# Patient Record
Sex: MALE | Race: WHITE | NOT HISPANIC OR LATINO | Employment: UNEMPLOYED | URBAN - METROPOLITAN AREA
[De-identification: names, ages, dates, MRNs, and addresses within clinical notes are randomized per-mention and may not be internally consistent; named-entity substitution may affect disease eponyms.]

---

## 2019-01-03 ENCOUNTER — HISTORICAL (OUTPATIENT)
Dept: RESPIRATORY THERAPY | Facility: HOSPITAL | Age: 59
End: 2019-01-03

## 2019-01-03 LAB
ABS NEUT (OLG): 2.61 X10(3)/MCL (ref 2.1–9.2)
ALBUMIN SERPL-MCNC: 4.1 GM/DL (ref 3.4–5)
ALBUMIN/GLOB SERPL: 1 RATIO (ref 1–2)
ALP SERPL-CCNC: 88 UNIT/L (ref 45–117)
ALT SERPL-CCNC: 27 UNIT/L (ref 12–78)
APPEARANCE, UA: CLEAR
AST SERPL-CCNC: 27 UNIT/L (ref 15–37)
BACTERIA #/AREA URNS AUTO: ABNORMAL /[HPF]
BASOPHILS # BLD AUTO: 0.05 X10(3)/MCL
BASOPHILS NFR BLD AUTO: 1 %
BILIRUB SERPL-MCNC: 0.7 MG/DL (ref 0.2–1)
BILIRUB UR QL STRIP: NEGATIVE
BILIRUBIN DIRECT+TOT PNL SERPL-MCNC: 0.2 MG/DL
BILIRUBIN DIRECT+TOT PNL SERPL-MCNC: 0.5 MG/DL
BUN SERPL-MCNC: 16 MG/DL (ref 7–18)
CALCIUM SERPL-MCNC: 8.8 MG/DL (ref 8.5–10.1)
CHLORIDE SERPL-SCNC: 106 MMOL/L (ref 98–107)
CHOLEST SERPL-MCNC: 142 MG/DL
CHOLEST/HDLC SERPL: 1.4 {RATIO} (ref 0–5)
CO2 SERPL-SCNC: 28 MMOL/L (ref 21–32)
COLOR UR: NORMAL
CREAT SERPL-MCNC: 1.1 MG/DL (ref 0.6–1.3)
EOSINOPHIL # BLD AUTO: 0.11 X10(3)/MCL
EOSINOPHIL NFR BLD AUTO: 2 %
ERYTHROCYTE [DISTWIDTH] IN BLOOD BY AUTOMATED COUNT: 12.9 % (ref 11.5–14.5)
EST. AVERAGE GLUCOSE BLD GHB EST-MCNC: 117 MG/DL
GLOBULIN SER-MCNC: 3.7 GM/ML (ref 2.3–3.5)
GLUCOSE (UA): NORMAL
GLUCOSE SERPL-MCNC: 96 MG/DL (ref 74–106)
HAV IGM SERPL QL IA: NONREACTIVE
HBA1C MFR BLD: 5.7 % (ref 4.2–6.3)
HBV CORE IGM SERPL QL IA: NONREACTIVE
HBV SURFACE AG SERPL QL IA: NEGATIVE
HCT VFR BLD AUTO: 38.6 % (ref 40–51)
HCV AB SERPL QL IA: NONREACTIVE
HDLC SERPL-MCNC: 103 MG/DL
HGB BLD-MCNC: 12.7 GM/DL (ref 13.5–17.5)
HGB UR QL STRIP: NEGATIVE
HIV 1+2 AB+HIV1 P24 AG SERPL QL IA: NONREACTIVE
HYALINE CASTS #/AREA URNS LPF: ABNORMAL /[LPF]
IMM GRANULOCYTES # BLD AUTO: 0.01 10*3/UL
IMM GRANULOCYTES NFR BLD AUTO: 0 %
KETONES UR QL STRIP: NEGATIVE
LDLC SERPL CALC-MCNC: 18 MG/DL (ref 0–130)
LEUKOCYTE ESTERASE UR QL STRIP: NEGATIVE
LYMPHOCYTES # BLD AUTO: 1.6 X10(3)/MCL
LYMPHOCYTES NFR BLD AUTO: 32 % (ref 13–40)
MCH RBC QN AUTO: 30.9 PG (ref 26–34)
MCHC RBC AUTO-ENTMCNC: 32.9 GM/DL (ref 31–37)
MCV RBC AUTO: 93.9 FL (ref 80–100)
MONOCYTES # BLD AUTO: 0.6 X10(3)/MCL
MONOCYTES NFR BLD AUTO: 12 % (ref 4–12)
NEUTROPHILS # BLD AUTO: 2.61 X10(3)/MCL
NEUTROPHILS NFR BLD AUTO: 52 X10(3)/MCL
NITRITE UR QL STRIP: NEGATIVE
PH UR STRIP: 5 [PH] (ref 4.5–8)
PLATELET # BLD AUTO: 180 X10(3)/MCL (ref 130–400)
PMV BLD AUTO: 11.6 FL (ref 7.4–10.4)
POTASSIUM SERPL-SCNC: 3.9 MMOL/L (ref 3.5–5.1)
PROT SERPL-MCNC: 7.8 GM/DL (ref 6.4–8.2)
PROT UR QL STRIP: NEGATIVE
PSA SERPL-MCNC: 1.3 NG/ML
RBC # BLD AUTO: 4.11 X10(6)/MCL (ref 4.5–5.9)
RBC #/AREA URNS AUTO: ABNORMAL /[HPF]
SODIUM SERPL-SCNC: 142 MMOL/L (ref 136–145)
SP GR UR STRIP: 1.02 (ref 1–1.03)
SQUAMOUS #/AREA URNS LPF: ABNORMAL /[LPF]
T PALLIDUM AB SER QL: NONREACTIVE
TRIGL SERPL-MCNC: 106 MG/DL
TSH SERPL-ACNC: 1.93 MIU/L (ref 0.36–3.74)
UROBILINOGEN UR STRIP-ACNC: NORMAL
VLDLC SERPL CALC-MCNC: 21 MG/DL
WBC # SPEC AUTO: 5 X10(3)/MCL (ref 4.5–11)
WBC #/AREA URNS AUTO: ABNORMAL /HPF

## 2019-03-28 ENCOUNTER — HISTORICAL (OUTPATIENT)
Dept: INTERNAL MEDICINE | Facility: CLINIC | Age: 59
End: 2019-03-28

## 2019-03-28 LAB
BUN SERPL-MCNC: 15 MG/DL (ref 7–18)
CALCIUM SERPL-MCNC: 9.3 MG/DL (ref 8.5–10.1)
CHLORIDE SERPL-SCNC: 107 MMOL/L (ref 98–107)
CO2 SERPL-SCNC: 31 MMOL/L (ref 21–32)
CREAT SERPL-MCNC: 1.1 MG/DL (ref 0.6–1.3)
CREAT/UREA NIT SERPL: 14
GLUCOSE SERPL-MCNC: 86 MG/DL (ref 74–106)
POTASSIUM SERPL-SCNC: 4.2 MMOL/L (ref 3.5–5.1)
SODIUM SERPL-SCNC: 140 MMOL/L (ref 136–145)

## 2019-04-18 ENCOUNTER — HISTORICAL (OUTPATIENT)
Dept: RADIOLOGY | Facility: HOSPITAL | Age: 59
End: 2019-04-18

## 2019-05-22 ENCOUNTER — HISTORICAL (OUTPATIENT)
Dept: RADIOLOGY | Facility: HOSPITAL | Age: 59
End: 2019-05-22

## 2019-07-02 ENCOUNTER — HISTORICAL (OUTPATIENT)
Dept: CARDIOLOGY | Facility: HOSPITAL | Age: 59
End: 2019-07-02

## 2019-07-02 LAB
ABS NEUT (OLG): 2.85 X10(3)/MCL (ref 2.1–9.2)
APTT PPP: 27.2 SECOND(S) (ref 23.3–37)
BASOPHILS # BLD AUTO: 0.04 X10(3)/MCL
BASOPHILS NFR BLD AUTO: 1 %
BUN SERPL-MCNC: 15 MG/DL (ref 7–18)
CALCIUM SERPL-MCNC: 9.5 MG/DL (ref 8.5–10.1)
CHLORIDE SERPL-SCNC: 111 MMOL/L (ref 98–107)
CO2 SERPL-SCNC: 29 MMOL/L (ref 21–32)
CREAT SERPL-MCNC: 1 MG/DL (ref 0.6–1.3)
CREAT/UREA NIT SERPL: 15
EOSINOPHIL # BLD AUTO: 0.19 X10(3)/MCL
EOSINOPHIL NFR BLD AUTO: 4 %
ERYTHROCYTE [DISTWIDTH] IN BLOOD BY AUTOMATED COUNT: 13.4 % (ref 11.5–14.5)
GLUCOSE SERPL-MCNC: 92 MG/DL (ref 74–106)
HCT VFR BLD AUTO: 41 % (ref 40–51)
HGB BLD-MCNC: 13.1 GM/DL (ref 13.5–17.5)
INR PPP: 1.03 (ref 0.9–1.2)
LYMPHOCYTES # BLD AUTO: 1.34 X10(3)/MCL
LYMPHOCYTES NFR BLD AUTO: 26 % (ref 13–40)
MCH RBC QN AUTO: 31.6 PG (ref 26–34)
MCHC RBC AUTO-ENTMCNC: 32 GM/DL (ref 31–37)
MCV RBC AUTO: 99 FL (ref 80–100)
MONOCYTES # BLD AUTO: 0.65 X10(3)/MCL
MONOCYTES NFR BLD AUTO: 13 % (ref 4–12)
NEUTROPHILS # BLD AUTO: 2.85 X10(3)/MCL
NEUTROPHILS NFR BLD AUTO: 56 X10(3)/MCL
PLATELET # BLD AUTO: 166 X10(3)/MCL (ref 130–400)
PMV BLD AUTO: 12.3 FL (ref 7.4–10.4)
POTASSIUM SERPL-SCNC: 4.3 MMOL/L (ref 3.5–5.1)
PROTHROMBIN TIME: 13.4 SECOND(S) (ref 11.9–14.4)
RBC # BLD AUTO: 4.14 X10(6)/MCL (ref 4.5–5.9)
SODIUM SERPL-SCNC: 142 MMOL/L (ref 136–145)
WBC # SPEC AUTO: 5.1 X10(3)/MCL (ref 4.5–11)

## 2019-07-12 ENCOUNTER — HISTORICAL (OUTPATIENT)
Dept: CARDIOLOGY | Facility: HOSPITAL | Age: 59
End: 2019-07-12

## 2019-07-29 ENCOUNTER — HISTORICAL (OUTPATIENT)
Dept: RADIOLOGY | Facility: HOSPITAL | Age: 59
End: 2019-07-29

## 2019-10-08 ENCOUNTER — HISTORICAL (OUTPATIENT)
Dept: INTERNAL MEDICINE | Facility: CLINIC | Age: 59
End: 2019-10-08

## 2019-10-08 LAB
ABS NEUT (OLG): 3.1 X10(3)/MCL (ref 2.1–9.2)
BASOPHILS # BLD AUTO: 0 X10(3)/MCL (ref 0–0.2)
BASOPHILS NFR BLD AUTO: 1 %
BUN SERPL-MCNC: 19 MG/DL (ref 7–18)
CALCIUM SERPL-MCNC: 9.3 MG/DL (ref 8.5–10.1)
CHLORIDE SERPL-SCNC: 111 MMOL/L (ref 98–107)
CHOLEST SERPL-MCNC: 150 MG/DL
CHOLEST/HDLC SERPL: 1.5 {RATIO} (ref 0–5)
CO2 SERPL-SCNC: 27 MMOL/L (ref 21–32)
CREAT SERPL-MCNC: 1.2 MG/DL (ref 0.6–1.3)
CREAT/UREA NIT SERPL: 16
EOSINOPHIL # BLD AUTO: 0.2 X10(3)/MCL (ref 0–0.9)
EOSINOPHIL NFR BLD AUTO: 3 %
ERYTHROCYTE [DISTWIDTH] IN BLOOD BY AUTOMATED COUNT: 12.2 % (ref 11.5–14.5)
EST. AVERAGE GLUCOSE BLD GHB EST-MCNC: 123 MG/DL
GLUCOSE SERPL-MCNC: 97 MG/DL (ref 74–106)
HBA1C MFR BLD: 5.9 % (ref 4.2–6.3)
HCT VFR BLD AUTO: 40.6 % (ref 40–51)
HDLC SERPL-MCNC: 99 MG/DL (ref 40–59)
HGB BLD-MCNC: 13.3 GM/DL (ref 13.5–17.5)
IMM GRANULOCYTES # BLD AUTO: 0.02 10*3/UL
IMM GRANULOCYTES NFR BLD AUTO: 0 %
LDLC SERPL CALC-MCNC: 35 MG/DL
LYMPHOCYTES # BLD AUTO: 2 X10(3)/MCL (ref 0.6–4.6)
LYMPHOCYTES NFR BLD AUTO: 32 %
MCH RBC QN AUTO: 31.7 PG (ref 26–34)
MCHC RBC AUTO-ENTMCNC: 32.8 GM/DL (ref 31–37)
MCV RBC AUTO: 96.9 FL (ref 80–100)
MONOCYTES # BLD AUTO: 0.8 X10(3)/MCL (ref 0.1–1.3)
MONOCYTES NFR BLD AUTO: 13 %
NEUTROPHILS # BLD AUTO: 3.1 X10(3)/MCL (ref 2.1–9.2)
NEUTROPHILS NFR BLD AUTO: 51 %
PLATELET # BLD AUTO: 176 X10(3)/MCL (ref 130–400)
PMV BLD AUTO: 11.9 FL (ref 7.4–10.4)
POTASSIUM SERPL-SCNC: 4.7 MMOL/L (ref 3.5–5.1)
RBC # BLD AUTO: 4.19 X10(6)/MCL (ref 4.5–5.9)
SODIUM SERPL-SCNC: 144 MMOL/L (ref 136–145)
TRIGL SERPL-MCNC: 80 MG/DL
VLDLC SERPL CALC-MCNC: 16 MG/DL
WBC # SPEC AUTO: 6.1 X10(3)/MCL (ref 4.5–11)

## 2020-01-06 ENCOUNTER — HISTORICAL (OUTPATIENT)
Dept: RADIOLOGY | Facility: HOSPITAL | Age: 60
End: 2020-01-06

## 2020-01-13 ENCOUNTER — HISTORICAL (OUTPATIENT)
Dept: INTERNAL MEDICINE | Facility: CLINIC | Age: 60
End: 2020-01-13

## 2020-01-13 LAB
ABS NEUT (OLG): 3.05 X10(3)/MCL (ref 2.1–9.2)
ALBUMIN SERPL-MCNC: 3.9 GM/DL (ref 3.4–5)
ALBUMIN/GLOB SERPL: 1.1 RATIO (ref 1.1–2)
ALP SERPL-CCNC: 77 UNIT/L (ref 45–117)
ALT SERPL-CCNC: 21 UNIT/L (ref 12–78)
APPEARANCE, UA: CLEAR
AST SERPL-CCNC: 12 UNIT/L (ref 15–37)
BACTERIA #/AREA URNS AUTO: ABNORMAL /HPF
BASOPHILS # BLD AUTO: 0.1 X10(3)/MCL (ref 0–0.2)
BASOPHILS NFR BLD AUTO: 2 %
BILIRUB SERPL-MCNC: 0.4 MG/DL (ref 0.2–1)
BILIRUB UR QL STRIP: NEGATIVE
BILIRUBIN DIRECT+TOT PNL SERPL-MCNC: 0.1 MG/DL (ref 0–0.2)
BILIRUBIN DIRECT+TOT PNL SERPL-MCNC: 0.3 MG/DL
BUN SERPL-MCNC: 25 MG/DL (ref 7–18)
CALCIUM SERPL-MCNC: 9.1 MG/DL (ref 8.5–10.1)
CHLORIDE SERPL-SCNC: 107 MMOL/L (ref 98–107)
CHOLEST SERPL-MCNC: 184 MG/DL
CHOLEST/HDLC SERPL: 2.4 {RATIO} (ref 0–5)
CO2 SERPL-SCNC: 28 MMOL/L (ref 21–32)
COLOR UR: NORMAL
CREAT SERPL-MCNC: 1.2 MG/DL (ref 0.6–1.3)
EOSINOPHIL # BLD AUTO: 0.1 X10(3)/MCL (ref 0–0.9)
EOSINOPHIL NFR BLD AUTO: 2 %
ERYTHROCYTE [DISTWIDTH] IN BLOOD BY AUTOMATED COUNT: 12.1 % (ref 11.5–14.5)
GLOBULIN SER-MCNC: 3.6 GM/ML (ref 2.3–3.5)
GLUCOSE (UA): NEGATIVE
GLUCOSE SERPL-MCNC: 84 MG/DL (ref 74–106)
HCT VFR BLD AUTO: 41 % (ref 40–51)
HDLC SERPL-MCNC: 77 MG/DL (ref 40–59)
HGB BLD-MCNC: 13.1 GM/DL (ref 13.5–17.5)
HGB UR QL STRIP: NEGATIVE
HYALINE CASTS #/AREA URNS LPF: ABNORMAL /LPF
IMM GRANULOCYTES # BLD AUTO: 0.01 10*3/UL
IMM GRANULOCYTES NFR BLD AUTO: 0 %
KETONES UR QL STRIP: NEGATIVE
LDLC SERPL CALC-MCNC: 84 MG/DL
LEUKOCYTE ESTERASE UR QL STRIP: NEGATIVE
LYMPHOCYTES # BLD AUTO: 1.8 X10(3)/MCL (ref 0.6–4.6)
LYMPHOCYTES NFR BLD AUTO: 31 %
MCH RBC QN AUTO: 32 PG (ref 26–34)
MCHC RBC AUTO-ENTMCNC: 32 GM/DL (ref 31–37)
MCV RBC AUTO: 100 FL (ref 80–100)
MONOCYTES # BLD AUTO: 0.7 X10(3)/MCL (ref 0.1–1.3)
MONOCYTES NFR BLD AUTO: 12 %
NEUTROPHILS # BLD AUTO: 3.05 X10(3)/MCL (ref 2.1–9.2)
NEUTROPHILS NFR BLD AUTO: 53 %
NITRITE UR QL STRIP: NEGATIVE
PH UR STRIP: 5 [PH] (ref 4.5–8)
PLATELET # BLD AUTO: 205 X10(3)/MCL (ref 130–400)
PMV BLD AUTO: 11.9 FL (ref 7.4–10.4)
POTASSIUM SERPL-SCNC: 4.5 MMOL/L (ref 3.5–5.1)
PROT SERPL-MCNC: 7.5 GM/DL (ref 6.4–8.2)
PROT UR QL STRIP: NEGATIVE
PSA SERPL-MCNC: 1.7 NG/ML
RBC # BLD AUTO: 4.1 X10(6)/MCL (ref 4.5–5.9)
RBC #/AREA URNS AUTO: ABNORMAL /HPF
SODIUM SERPL-SCNC: 140 MMOL/L (ref 136–145)
SP GR UR STRIP: 1.01 (ref 1–1.03)
SQUAMOUS #/AREA URNS LPF: ABNORMAL /LPF
TRIGL SERPL-MCNC: 115 MG/DL
TSH SERPL-ACNC: 2.84 MIU/L (ref 0.36–3.74)
UROBILINOGEN UR STRIP-ACNC: NORMAL
VLDLC SERPL CALC-MCNC: 23 MG/DL
WBC # SPEC AUTO: 5.7 X10(3)/MCL (ref 4.5–11)
WBC #/AREA URNS AUTO: ABNORMAL /HPF

## 2020-06-30 ENCOUNTER — HISTORICAL (OUTPATIENT)
Dept: INTERNAL MEDICINE | Facility: CLINIC | Age: 60
End: 2020-06-30

## 2020-06-30 LAB
ABS NEUT (OLG): 2.24 X10(3)/MCL (ref 2.1–9.2)
BASOPHILS # BLD AUTO: 0.1 X10(3)/MCL (ref 0–0.2)
BASOPHILS NFR BLD AUTO: 1 %
BUN SERPL-MCNC: 17 MG/DL (ref 7–18)
CALCIUM SERPL-MCNC: 9.6 MG/DL (ref 8.5–10.1)
CHLORIDE SERPL-SCNC: 106 MMOL/L (ref 98–107)
CO2 SERPL-SCNC: 28 MMOL/L (ref 21–32)
CREAT SERPL-MCNC: 1.1 MG/DL (ref 0.6–1.3)
CREAT/UREA NIT SERPL: 15
EOSINOPHIL # BLD AUTO: 0.1 X10(3)/MCL (ref 0–0.9)
EOSINOPHIL NFR BLD AUTO: 2 %
ERYTHROCYTE [DISTWIDTH] IN BLOOD BY AUTOMATED COUNT: 12.7 % (ref 11.5–14.5)
GLUCOSE SERPL-MCNC: 93 MG/DL (ref 74–106)
HCT VFR BLD AUTO: 41.4 % (ref 40–51)
HGB BLD-MCNC: 13.4 GM/DL (ref 13.5–17.5)
IMM GRANULOCYTES # BLD AUTO: 0.02 10*3/UL
IMM GRANULOCYTES NFR BLD AUTO: 0 %
LYMPHOCYTES # BLD AUTO: 1.4 X10(3)/MCL (ref 0.6–4.6)
LYMPHOCYTES NFR BLD AUTO: 33 %
MCH RBC QN AUTO: 31.9 PG (ref 26–34)
MCHC RBC AUTO-ENTMCNC: 32.4 GM/DL (ref 31–37)
MCV RBC AUTO: 98.6 FL (ref 80–100)
MONOCYTES # BLD AUTO: 0.6 X10(3)/MCL (ref 0.1–1.3)
MONOCYTES NFR BLD AUTO: 12 %
NEUTROPHILS # BLD AUTO: 2.24 X10(3)/MCL (ref 2.1–9.2)
NEUTROPHILS NFR BLD AUTO: 50 %
PLATELET # BLD AUTO: 164 X10(3)/MCL (ref 130–400)
PMV BLD AUTO: 12.2 FL (ref 7.4–10.4)
POTASSIUM SERPL-SCNC: 4.3 MMOL/L (ref 3.5–5.1)
RBC # BLD AUTO: 4.2 X10(6)/MCL (ref 4.5–5.9)
SODIUM SERPL-SCNC: 140 MMOL/L (ref 136–145)
WBC # SPEC AUTO: 4.4 X10(3)/MCL (ref 4.5–11)

## 2020-08-12 ENCOUNTER — HISTORICAL (OUTPATIENT)
Dept: RADIOLOGY | Facility: HOSPITAL | Age: 60
End: 2020-08-12

## 2020-12-07 ENCOUNTER — HISTORICAL (OUTPATIENT)
Dept: RADIOLOGY | Facility: HOSPITAL | Age: 60
End: 2020-12-07

## 2021-01-04 ENCOUNTER — HISTORICAL (OUTPATIENT)
Dept: INTERNAL MEDICINE | Facility: CLINIC | Age: 61
End: 2021-01-04

## 2021-01-04 LAB
ABS NEUT (OLG): 2.64 X10(3)/MCL (ref 2.1–9.2)
ALBUMIN SERPL-MCNC: 4 GM/DL (ref 3.4–4.8)
ALBUMIN/GLOB SERPL: 1.2 RATIO (ref 1.1–2)
ALP SERPL-CCNC: 74 UNIT/L (ref 40–150)
ALT SERPL-CCNC: 19 UNIT/L (ref 0–55)
APPEARANCE, UA: CLEAR
AST SERPL-CCNC: 20 UNIT/L (ref 5–34)
BACTERIA #/AREA URNS AUTO: ABNORMAL /HPF
BASOPHILS # BLD AUTO: 0.1 X10(3)/MCL (ref 0–0.2)
BASOPHILS NFR BLD AUTO: 1 %
BILIRUB SERPL-MCNC: 0.5 MG/DL
BILIRUB UR QL STRIP: NEGATIVE
BILIRUBIN DIRECT+TOT PNL SERPL-MCNC: 0.2 MG/DL (ref 0–0.5)
BILIRUBIN DIRECT+TOT PNL SERPL-MCNC: 0.3 MG/DL (ref 0–0.8)
BUN SERPL-MCNC: 15 MG/DL (ref 8.4–25.7)
CALCIUM SERPL-MCNC: 9.7 MG/DL (ref 8.8–10)
CHLORIDE SERPL-SCNC: 104 MMOL/L (ref 98–107)
CHOLEST SERPL-MCNC: 192 MG/DL
CHOLEST/HDLC SERPL: 2 {RATIO} (ref 0–5)
CO2 SERPL-SCNC: 29 MMOL/L (ref 23–31)
COLOR UR: NORMAL
CREAT SERPL-MCNC: 1.14 MG/DL (ref 0.73–1.18)
EOSINOPHIL # BLD AUTO: 0.1 X10(3)/MCL (ref 0–0.9)
EOSINOPHIL NFR BLD AUTO: 2 %
ERYTHROCYTE [DISTWIDTH] IN BLOOD BY AUTOMATED COUNT: 11.9 % (ref 11.5–14.5)
EST. AVERAGE GLUCOSE BLD GHB EST-MCNC: 102.5 MG/DL
GLOBULIN SER-MCNC: 3.2 GM/DL (ref 2.4–3.5)
GLUCOSE (UA): NEGATIVE
GLUCOSE SERPL-MCNC: 90 MG/DL (ref 82–115)
HBA1C MFR BLD: 5.2 %
HCT VFR BLD AUTO: 39.7 % (ref 40–51)
HDLC SERPL-MCNC: 90 MG/DL (ref 35–60)
HGB BLD-MCNC: 12.9 GM/DL (ref 13.5–17.5)
HGB UR QL STRIP: NEGATIVE
HYALINE CASTS #/AREA URNS LPF: ABNORMAL /LPF
IMM GRANULOCYTES # BLD AUTO: 0.01 10*3/UL
IMM GRANULOCYTES NFR BLD AUTO: 0 %
KETONES UR QL STRIP: NEGATIVE
LDLC SERPL CALC-MCNC: 80 MG/DL (ref 50–140)
LEUKOCYTE ESTERASE UR QL STRIP: NEGATIVE
LYMPHOCYTES # BLD AUTO: 1.4 X10(3)/MCL (ref 0.6–4.6)
LYMPHOCYTES NFR BLD AUTO: 29 %
MCH RBC QN AUTO: 32.6 PG (ref 26–34)
MCHC RBC AUTO-ENTMCNC: 32.5 GM/DL (ref 31–37)
MCV RBC AUTO: 100.3 FL (ref 80–100)
MONOCYTES # BLD AUTO: 0.6 X10(3)/MCL (ref 0.1–1.3)
MONOCYTES NFR BLD AUTO: 12 %
NEUTROPHILS # BLD AUTO: 2.64 X10(3)/MCL (ref 2.1–9.2)
NEUTROPHILS NFR BLD AUTO: 56 %
NITRITE UR QL STRIP: NEGATIVE
PH UR STRIP: 5 [PH] (ref 4.5–8)
PLATELET # BLD AUTO: 155 X10(3)/MCL (ref 130–400)
PMV BLD AUTO: 12.2 FL (ref 7.4–10.4)
POTASSIUM SERPL-SCNC: 4.1 MMOL/L (ref 3.5–5.1)
PROT SERPL-MCNC: 7.2 GM/DL (ref 5.8–7.6)
PROT UR QL STRIP: NEGATIVE
PSA SERPL-MCNC: 2.04 NG/ML
RBC # BLD AUTO: 3.96 X10(6)/MCL (ref 4.5–5.9)
RBC #/AREA URNS AUTO: ABNORMAL /HPF
SODIUM SERPL-SCNC: 142 MMOL/L (ref 136–145)
SP GR UR STRIP: 1.01 (ref 1–1.03)
SQUAMOUS #/AREA URNS LPF: ABNORMAL /LPF
TRIGL SERPL-MCNC: 111 MG/DL (ref 34–140)
TSH SERPL-ACNC: 1.87 UIU/ML (ref 0.35–4.94)
UROBILINOGEN UR STRIP-ACNC: NORMAL
VLDLC SERPL CALC-MCNC: 22 MG/DL
WBC # SPEC AUTO: 4.7 X10(3)/MCL (ref 4.5–11)
WBC #/AREA URNS AUTO: ABNORMAL /HPF

## 2021-01-21 ENCOUNTER — HISTORICAL (OUTPATIENT)
Dept: ADMINISTRATIVE | Facility: HOSPITAL | Age: 61
End: 2021-01-21

## 2021-02-19 ENCOUNTER — HISTORICAL (OUTPATIENT)
Dept: RADIOLOGY | Facility: HOSPITAL | Age: 61
End: 2021-02-19

## 2021-03-05 ENCOUNTER — HISTORICAL (OUTPATIENT)
Dept: RADIOLOGY | Facility: HOSPITAL | Age: 61
End: 2021-03-05

## 2021-06-29 ENCOUNTER — HISTORICAL (OUTPATIENT)
Dept: INTERNAL MEDICINE | Facility: CLINIC | Age: 61
End: 2021-06-29

## 2021-06-29 LAB
ABS NEUT (OLG): 3.16 X10(3)/MCL (ref 2.1–9.2)
BASOPHILS # BLD AUTO: 0.1 X10(3)/MCL (ref 0–0.2)
BASOPHILS NFR BLD AUTO: 1 %
BUN SERPL-MCNC: 26.2 MG/DL (ref 8.4–25.7)
CALCIUM SERPL-MCNC: 10 MG/DL (ref 8.8–10)
CHLORIDE SERPL-SCNC: 109 MMOL/L (ref 98–107)
CO2 SERPL-SCNC: 24 MMOL/L (ref 23–31)
CREAT SERPL-MCNC: 1.23 MG/DL (ref 0.73–1.18)
CREAT/UREA NIT SERPL: 21
EOSINOPHIL # BLD AUTO: 0.1 X10(3)/MCL (ref 0–0.9)
EOSINOPHIL NFR BLD AUTO: 2 %
ERYTHROCYTE [DISTWIDTH] IN BLOOD BY AUTOMATED COUNT: 14.1 % (ref 11.5–14.5)
FERRITIN SERPL-MCNC: 301.6 NG/ML (ref 21.81–274.66)
FOLATE SERPL-MCNC: 5.5 NG/ML (ref 7–31.4)
GLUCOSE SERPL-MCNC: 101 MG/DL (ref 82–115)
HCT VFR BLD AUTO: 38.2 % (ref 40–51)
HGB BLD-MCNC: 12.4 GM/DL (ref 13.5–17.5)
IMM GRANULOCYTES # BLD AUTO: 0.01 10*3/UL
IMM GRANULOCYTES NFR BLD AUTO: 0 %
IRON SATN MFR SERPL: 30 % (ref 20–50)
IRON SERPL-MCNC: 95 UG/DL (ref 65–175)
LYMPHOCYTES # BLD AUTO: 1.5 X10(3)/MCL (ref 0.6–4.6)
LYMPHOCYTES NFR BLD AUTO: 27 %
MCH RBC QN AUTO: 31.9 PG (ref 26–34)
MCHC RBC AUTO-ENTMCNC: 32.5 GM/DL (ref 31–37)
MCV RBC AUTO: 98.2 FL (ref 80–100)
MONOCYTES # BLD AUTO: 0.6 X10(3)/MCL (ref 0.1–1.3)
MONOCYTES NFR BLD AUTO: 12 %
NEUTROPHILS # BLD AUTO: 3.16 X10(3)/MCL (ref 2.1–9.2)
NEUTROPHILS NFR BLD AUTO: 58 %
NRBC BLD AUTO-RTO: 0 % (ref 0–0.2)
PLATELET # BLD AUTO: 165 X10(3)/MCL (ref 130–400)
PMV BLD AUTO: 12 FL (ref 7.4–10.4)
POTASSIUM SERPL-SCNC: 4.3 MMOL/L (ref 3.5–5.1)
RBC # BLD AUTO: 3.89 X10(6)/MCL (ref 4.5–5.9)
SODIUM SERPL-SCNC: 143 MMOL/L (ref 136–145)
TIBC SERPL-MCNC: 219 UG/DL (ref 69–240)
TIBC SERPL-MCNC: 314 UG/DL (ref 250–450)
TRANSFERRIN SERPL-MCNC: 279 MG/DL (ref 163–344)
VIT B12 SERPL-MCNC: 1016 PG/ML (ref 213–816)
WBC # SPEC AUTO: 5.5 X10(3)/MCL (ref 4.5–11)

## 2021-08-16 ENCOUNTER — HISTORICAL (OUTPATIENT)
Dept: RADIOLOGY | Facility: HOSPITAL | Age: 61
End: 2021-08-16

## 2022-01-18 ENCOUNTER — HISTORICAL (OUTPATIENT)
Dept: GASTROENTEROLOGY | Facility: CLINIC | Age: 62
End: 2022-01-18

## 2022-01-19 ENCOUNTER — HISTORICAL (OUTPATIENT)
Dept: GASTROENTEROLOGY | Facility: CLINIC | Age: 62
End: 2022-01-19

## 2022-01-19 ENCOUNTER — HISTORICAL (OUTPATIENT)
Dept: CARDIOLOGY | Facility: HOSPITAL | Age: 62
End: 2022-01-19

## 2022-01-19 LAB
ABS NEUT (OLG): 5.06 X10(3)/MCL (ref 2.1–9.2)
ALBUMIN SERPL-MCNC: 4 GM/DL (ref 3.4–4.8)
ALBUMIN/GLOB SERPL: 1.1 RATIO (ref 1.1–2)
ALP SERPL-CCNC: 94 UNIT/L (ref 40–150)
ALT SERPL-CCNC: 20 UNIT/L (ref 0–55)
APPEARANCE, UA: CLEAR
AST SERPL-CCNC: 18 UNIT/L (ref 5–34)
BACTERIA SPEC CULT: ABNORMAL
BASOPHILS # BLD AUTO: 0 X10(3)/MCL (ref 0–0.2)
BASOPHILS NFR BLD AUTO: 0 %
BILIRUB SERPL-MCNC: 0.3 MG/DL
BILIRUB UR QL STRIP: NEGATIVE
BILIRUBIN DIRECT+TOT PNL SERPL-MCNC: 0.1 MG/DL (ref 0–0.8)
BILIRUBIN DIRECT+TOT PNL SERPL-MCNC: 0.2 MG/DL (ref 0–0.5)
BUN SERPL-MCNC: 7.7 MG/DL (ref 8.4–25.7)
CALCIUM SERPL-MCNC: 10.1 MG/DL (ref 8.7–10.5)
CHLORIDE SERPL-SCNC: 105 MMOL/L (ref 98–107)
CHOLEST SERPL-MCNC: 155 MG/DL
CHOLEST/HDLC SERPL: 2 {RATIO} (ref 0–5)
CO2 SERPL-SCNC: 28 MMOL/L (ref 23–31)
COLOR UR: ABNORMAL
CREAT SERPL-MCNC: 0.88 MG/DL (ref 0.73–1.18)
EOSINOPHIL # BLD AUTO: 0.1 X10(3)/MCL (ref 0–0.9)
EOSINOPHIL NFR BLD AUTO: 1 %
ERYTHROCYTE [DISTWIDTH] IN BLOOD BY AUTOMATED COUNT: 12.1 % (ref 11.5–14.5)
EST. AVERAGE GLUCOSE BLD GHB EST-MCNC: 102.5 MG/DL
GLOBULIN SER-MCNC: 3.8 GM/DL (ref 2.4–3.5)
GLUCOSE (UA): NORMAL /UL
GLUCOSE SERPL-MCNC: 108 MG/DL (ref 82–115)
HBA1C MFR BLD: 5.2 %
HCT VFR BLD AUTO: 40.8 % (ref 40–51)
HDLC SERPL-MCNC: 75 MG/DL (ref 35–60)
HGB BLD-MCNC: 13.3 GM/DL (ref 13.5–17.5)
HGB UR QL STRIP: NEGATIVE /HPF
HYALINE CASTS #/AREA URNS LPF: ABNORMAL /LPF
IMM GRANULOCYTES # BLD AUTO: 0.02 10*3/UL
IMM GRANULOCYTES NFR BLD AUTO: 0 %
KETONES UR QL STRIP: NEGATIVE /UL
LDLC SERPL CALC-MCNC: 57 MG/DL (ref 50–140)
LEUKOCYTE ESTERASE UR QL STRIP: NEGATIVE
LYMPHOCYTES # BLD AUTO: 1.1 X10(3)/MCL (ref 0.6–4.6)
LYMPHOCYTES NFR BLD AUTO: 15 %
MCH RBC QN AUTO: 33.1 PG (ref 26–34)
MCHC RBC AUTO-ENTMCNC: 32.6 GM/DL (ref 31–37)
MCV RBC AUTO: 101.5 FL (ref 80–100)
MONOCYTES # BLD AUTO: 1.1 X10(3)/MCL (ref 0.1–1.3)
MONOCYTES NFR BLD AUTO: 15 %
MUCOUS THREADS URNS QL MICRO: ABNORMAL /LPF
NEUTROPHILS # BLD AUTO: 5.06 X10(3)/MCL (ref 2.1–9.2)
NEUTROPHILS NFR BLD AUTO: 68 %
NITRITE UR QL STRIP: NEGATIVE
NRBC BLD AUTO-RTO: 0 % (ref 0–0.2)
PH UR STRIP: 7.5 /UL (ref 4.5–8)
PLATELET # BLD AUTO: 164 X10(3)/MCL (ref 130–400)
PMV BLD AUTO: 11.8 FL (ref 7.4–10.4)
POTASSIUM SERPL-SCNC: 4.1 MMOL/L (ref 3.5–5.1)
PROT SERPL-MCNC: 7.8 GM/DL (ref 5.8–7.6)
PROT UR QL STRIP: NEGATIVE /UL
PSA SERPL-MCNC: 1.53 NG/ML
RBC # BLD AUTO: 4.02 X10(6)/MCL (ref 4.5–5.9)
RBC #/AREA URNS HPF: ABNORMAL /HPF
SARS-COV-2 AG RESP QL IA.RAPID: POSITIVE
SODIUM SERPL-SCNC: 143 MMOL/L (ref 136–145)
SP GR UR STRIP: 1.02 (ref 1–1.03)
SQUAMOUS EPITHELIAL, UA: ABNORMAL /HPF
TRIGL SERPL-MCNC: 117 MG/DL (ref 34–140)
TSH SERPL-ACNC: 1.85 UIU/ML (ref 0.35–4.94)
UROBILINOGEN UR STRIP-ACNC: NORMAL /HPF
VLDLC SERPL CALC-MCNC: 23 MG/DL
WBC # SPEC AUTO: 7.4 X10(3)/MCL (ref 4.5–11)
WBC #/AREA URNS HPF: ABNORMAL /HPF

## 2022-04-09 ENCOUNTER — HISTORICAL (OUTPATIENT)
Dept: ADMINISTRATIVE | Facility: HOSPITAL | Age: 62
End: 2022-04-09
Payer: MEDICARE

## 2022-04-29 VITALS
BODY MASS INDEX: 22.87 KG/M2 | HEIGHT: 72 IN | DIASTOLIC BLOOD PRESSURE: 70 MMHG | WEIGHT: 168.88 LBS | SYSTOLIC BLOOD PRESSURE: 142 MMHG | OXYGEN SATURATION: 97 %

## 2022-04-29 RX ORDER — ISOSORBIDE MONONITRATE 30 MG/1
30 TABLET, EXTENDED RELEASE ORAL DAILY
COMMUNITY
End: 2022-07-08

## 2022-04-29 RX ORDER — ASPIRIN 81 MG/1
81 TABLET ORAL DAILY
COMMUNITY
End: 2022-07-08

## 2022-04-29 RX ORDER — AMLODIPINE BESYLATE 10 MG/1
10 TABLET ORAL DAILY
COMMUNITY
End: 2022-07-08

## 2022-04-29 RX ORDER — IPRATROPIUM BROMIDE AND ALBUTEROL SULFATE 2.5; .5 MG/3ML; MG/3ML
3 SOLUTION RESPIRATORY (INHALATION) EVERY 6 HOURS PRN
COMMUNITY
End: 2022-07-08

## 2022-04-29 RX ORDER — METOPROLOL SUCCINATE 25 MG/1
25 TABLET, EXTENDED RELEASE ORAL DAILY
COMMUNITY
End: 2022-07-08

## 2022-04-29 RX ORDER — BENAZEPRIL HYDROCHLORIDE 40 MG/1
40 TABLET ORAL DAILY
COMMUNITY
End: 2022-07-08

## 2022-04-29 RX ORDER — NITROGLYCERIN 0.4 MG/1
0.4 TABLET SUBLINGUAL EVERY 5 MIN PRN
COMMUNITY

## 2022-04-29 RX ORDER — ALBUTEROL SULFATE 90 UG/1
2 AEROSOL, METERED RESPIRATORY (INHALATION) EVERY 6 HOURS PRN
COMMUNITY
End: 2022-07-08 | Stop reason: SDUPTHER

## 2022-04-29 RX ORDER — FOLIC ACID 1 MG/1
1 TABLET ORAL DAILY
COMMUNITY
End: 2022-07-08

## 2022-04-29 RX ORDER — ATORVASTATIN CALCIUM 10 MG/1
10 TABLET, FILM COATED ORAL DAILY
COMMUNITY
End: 2022-07-08

## 2022-05-09 ENCOUNTER — APPOINTMENT (OUTPATIENT)
Dept: ENDOSCOPY | Facility: HOSPITAL | Age: 62
End: 2022-05-09
Attending: INTERNAL MEDICINE
Payer: MEDICARE

## 2022-05-09 DIAGNOSIS — Z01.818 PRE-OP TESTING: ICD-10-CM

## 2022-05-09 LAB — SARS-COV-2 RNA RESP QL NAA+PROBE: NOT DETECTED

## 2022-05-09 PROCEDURE — 87635 SARS-COV-2 COVID-19 AMP PRB: CPT

## 2022-05-10 ENCOUNTER — ANESTHESIA EVENT (OUTPATIENT)
Dept: ENDOSCOPY | Facility: HOSPITAL | Age: 62
End: 2022-05-10
Payer: MEDICARE

## 2022-05-10 RX ORDER — LIDOCAINE HYDROCHLORIDE 10 MG/ML
1 INJECTION, SOLUTION EPIDURAL; INFILTRATION; INTRACAUDAL; PERINEURAL ONCE
Status: CANCELLED | OUTPATIENT
Start: 2022-05-10 | End: 2022-05-10

## 2022-05-10 RX ORDER — IPRATROPIUM BROMIDE AND ALBUTEROL SULFATE 2.5; .5 MG/3ML; MG/3ML
3 SOLUTION RESPIRATORY (INHALATION) ONCE
Status: CANCELLED | OUTPATIENT
Start: 2022-05-10 | End: 2022-05-10

## 2022-05-10 NOTE — ANESTHESIA PREPROCEDURE EVALUATION
05/10/2022  Len Goss is a 61 y.o., male for CLN screening      There were no vitals filed for this visit.    Active Ambulatory Problems     Diagnosis Date Noted    No Active Ambulatory Problems     Resolved Ambulatory Problems     Diagnosis Date Noted    No Resolved Ambulatory Problems     Past Medical History:   Diagnosis Date    Anemia, unspecified     Cardiovascular disease     COPD (chronic obstructive pulmonary disease)     Hypertension     Mixed hyperlipidemia     Prediabetes     Stage 2 chronic kidney disease        No past surgical history on file.    Pre-op Assessment    I have reviewed the Patient Summary Reports.     I have reviewed the Nursing Notes. I have reviewed the NPO Status.   I have reviewed the Medications.     Review of Systems  Anesthesia Hx:  No previous Anesthesia  Neg history of prior surgery. Denies Family Hx of Anesthesia complications.   Denies Personal Hx of Anesthesia complications.   Social:  Non-Smoker    Hematology/Oncology:  Hematology Normal   Oncology Normal     EENT/Dental:EENT/Dental Normal   Cardiovascular:   Exercise tolerance: poor Hypertension    Pulmonary:   COPD, severe    Renal/:   Chronic Renal Disease    Hepatic/GI:  Hepatic/GI Normal    Neurological:  Neurology Normal    Endocrine:  Endocrine Normal    Dermatological:  Skin Normal    Psych:  Psychiatric Normal           Physical Exam  General: Well nourished, Cooperative, Alert and Oriented    Airway:  Mallampati: I / I  Mouth Opening: Normal  TM Distance: Normal  Tongue: Large    Dental:  Intact        Anesthesia Plan  Type of Anesthesia, risks & benefits discussed:    Anesthesia Type: MAC  Intra-op Monitoring Plan: Standard ASA Monitors  Post Op Pain Control Plan: IV/PO Opioids PRN  (medical reason for not using multimodal pain management)  Induction:  IV  Informed Consent: Informed  consent signed with the Patient and all parties understand the risks and agree with anesthesia plan.  All questions answered. Patient consented to blood products? No  ASA Score: 4  Day of Surgery Review of History & Physical: H&P Update referred to the surgeon/provider.I have interviewed and examined the patient. I have reviewed the patient's H&P dated: There are no significant changes. H&P completed by Anesthesiologist.    Ready For Surgery From Anesthesia Perspective.     .

## 2022-05-12 ENCOUNTER — HOSPITAL ENCOUNTER (OUTPATIENT)
Facility: HOSPITAL | Age: 62
Discharge: HOME | End: 2022-05-12
Attending: INTERNAL MEDICINE | Admitting: INTERNAL MEDICINE
Payer: MEDICARE

## 2022-05-12 ENCOUNTER — ANESTHESIA (OUTPATIENT)
Dept: ENDOSCOPY | Facility: HOSPITAL | Age: 62
End: 2022-05-12
Payer: MEDICARE

## 2022-05-12 DIAGNOSIS — R19.5 OCCULT BLOOD POSITIVE STOOL: Primary | ICD-10-CM

## 2022-05-12 PROBLEM — K57.31 DIVERTICULOSIS LARGE INTESTINE W/O PERFORATION OR ABSCESS W/BLEEDING: Status: ACTIVE | Noted: 2022-05-12

## 2022-05-12 PROCEDURE — 25000003 PHARM REV CODE 250

## 2022-05-12 PROCEDURE — 45378 DIAGNOSTIC COLONOSCOPY: CPT | Performed by: INTERNAL MEDICINE

## 2022-05-12 PROCEDURE — 37000008 HC ANESTHESIA 1ST 15 MINUTES: Performed by: INTERNAL MEDICINE

## 2022-05-12 PROCEDURE — 45378 PR COLONOSCOPY,DIAGNOSTIC: ICD-10-PCS | Mod: ,,, | Performed by: INTERNAL MEDICINE

## 2022-05-12 PROCEDURE — 63600175 PHARM REV CODE 636 W HCPCS

## 2022-05-12 PROCEDURE — 25000003 PHARM REV CODE 250: Performed by: NURSE ANESTHETIST, CERTIFIED REGISTERED

## 2022-05-12 PROCEDURE — 63600175 PHARM REV CODE 636 W HCPCS: Performed by: NURSE ANESTHETIST, CERTIFIED REGISTERED

## 2022-05-12 PROCEDURE — 37000009 HC ANESTHESIA EA ADD 15 MINS: Performed by: INTERNAL MEDICINE

## 2022-05-12 PROCEDURE — 45378 DIAGNOSTIC COLONOSCOPY: CPT | Mod: ,,, | Performed by: INTERNAL MEDICINE

## 2022-05-12 RX ORDER — PROPOFOL 10 MG/ML
VIAL (ML) INTRAVENOUS
Status: DISCONTINUED | OUTPATIENT
Start: 2022-05-12 | End: 2022-05-12

## 2022-05-12 RX ORDER — SODIUM CHLORIDE 9 MG/ML
INJECTION, SOLUTION INTRAVENOUS CONTINUOUS
Status: DISCONTINUED | OUTPATIENT
Start: 2022-05-12 | End: 2022-05-12

## 2022-05-12 RX ORDER — LIDOCAINE HYDROCHLORIDE 10 MG/ML
1 INJECTION, SOLUTION EPIDURAL; INFILTRATION; INTRACAUDAL; PERINEURAL ONCE
Status: DISCONTINUED | OUTPATIENT
Start: 2022-05-12 | End: 2022-05-12 | Stop reason: HOSPADM

## 2022-05-12 RX ORDER — SODIUM CHLORIDE, SODIUM LACTATE, POTASSIUM CHLORIDE, CALCIUM CHLORIDE 600; 310; 30; 20 MG/100ML; MG/100ML; MG/100ML; MG/100ML
INJECTION, SOLUTION INTRAVENOUS CONTINUOUS
Status: DISCONTINUED | OUTPATIENT
Start: 2022-05-12 | End: 2022-05-12 | Stop reason: HOSPADM

## 2022-05-12 RX ORDER — LIDOCAINE HYDROCHLORIDE 20 MG/ML
INJECTION, SOLUTION EPIDURAL; INFILTRATION; INTRACAUDAL; PERINEURAL
Status: DISCONTINUED | OUTPATIENT
Start: 2022-05-12 | End: 2022-05-12

## 2022-05-12 RX ORDER — SODIUM CHLORIDE 9 MG/ML
INJECTION, SOLUTION INTRAVENOUS CONTINUOUS
Status: DISCONTINUED | OUTPATIENT
Start: 2022-05-12 | End: 2022-05-12 | Stop reason: HOSPADM

## 2022-05-12 RX ADMIN — PROPOFOL 40 MG: 10 INJECTION, EMULSION INTRAVENOUS at 11:05

## 2022-05-12 RX ADMIN — SODIUM CHLORIDE: 9 INJECTION, SOLUTION INTRAVENOUS at 10:05

## 2022-05-12 RX ADMIN — PROPOFOL 80 MG: 10 INJECTION, EMULSION INTRAVENOUS at 11:05

## 2022-05-12 RX ADMIN — LIDOCAINE HYDROCHLORIDE 80 MG: 20 INJECTION, SOLUTION EPIDURAL; INFILTRATION; INTRACAUDAL; PERINEURAL at 11:05

## 2022-05-12 NOTE — PLAN OF CARE
Patient awake and alert, sitting up in bed drinking a soda. Awaiting MD rounding prior to discharge. No distress noted.

## 2022-05-12 NOTE — H&P
"COLONOSCOPY HISTORY AND PHYSICAL EXAM    Procedure : Colonoscopy      HPI: Len Goss is a 60 yo M with a PMH of COPD (intermittently on home O2), HTN, HLD, and CKD stage II who presents today for screening colonoscopy after a positive FIT test. He has never had a colonoscopy before. He had been doing annual FIT testing until his positive test two years ago. He was delayed in getting setup for his colonoscopy secondary to COVID. Denies melena, hematochezia, recent weight loss, fevers, chills, nausea, vomiting, or abdominal pain.    Family Hx of CRC: None    Last Colonoscopy:  Never had one.       Past Medical History:   Diagnosis Date    Anemia, unspecified     Cardiovascular disease     COPD (chronic obstructive pulmonary disease)     Hypertension     Mixed hyperlipidemia     Prediabetes     Stage 2 chronic kidney disease        Family History   Problem Relation Age of Onset    Diabetes type II Mother     Lung cancer Mother     Hypertension Father        Social History     Socioeconomic History    Marital status:    Tobacco Use    Smoking status: Unknown If Ever Smoked   Substance and Sexual Activity    Alcohol use: Yes     Alcohol/week: 1.0 standard drink     Types: 1 Cans of beer per week     Comment: daily    Drug use: Never    Sexual activity: Yes       Review of Systems - Negative except   Respiratory ROS: no dyspnea  Cardiovascular ROS: no exertional chest pain  Gastrointestinal ROS: NO abdominal discomfort,  NO rectal bleeding  Musculoskeletal ROS: no muscular pain  Neurological ROS: no recent stroke    Physical Exam:  Ht 6' 1.23" (1.86 m)   Wt 75.6 kg (166 lb 10.7 oz)   BMI 21.85 kg/m²   General: no acute distress  Head: atraumatic   Neck: trachea midline  Lungs: equal chest rise  Heart: regular rate  Abdomen: soft, NT, mild distention  Extremities: full ROM to all four extremities    ASA:  II    Assessment/Plan  60 yo M with a PMH of COPD (intermittently on home O2), HTN, HLD, " and CKD stage II who presents today for screening colonoscopy after a positive FIT test. He has never had a colonoscopy before.    - To GI lab for colonoscopy  - The details of the procedure, the possible need for biopsy or polypectomy and the potential risks including bleeding, perforation, missed polyps were discussed in detail.  - Consent signed and placed in chart    [facility] Surgery 2

## 2022-05-12 NOTE — ANESTHESIA POSTPROCEDURE EVALUATION
Anesthesia Post Evaluation    Patient: Len Goss    Procedure(s) Performed: Procedure(s) (LRB):  COLONOSCOPY (N/A)    Final Anesthesia Type: general      Patient location during evaluation: GI PACU  Patient participation: Yes- Able to Participate  Level of consciousness: awake  Post-procedure vital signs: reviewed and stable  Pain management: adequate  Airway patency: patent    PONV status at discharge: No PONV  Anesthetic complications: no      Cardiovascular status: hemodynamically stable  Respiratory status: room air, unassisted and spontaneous ventilation  Hydration status: euvolemic  Follow-up not needed.          Vitals Value Taken Time   /76 05/12/22 1008   Temp 36.5 °C (97.7 °F) 05/12/22 0930   Pulse 56 05/12/22 0930   Resp 18 05/12/22 0930   SpO2 100 % 05/12/22 0930         No case tracking events are documented in the log.      Pain/Cassandra Score: No data recorded

## 2022-05-12 NOTE — PROVATION PATIENT INSTRUCTIONS
Discharge Summary/Instructions after an Endoscopic Procedure  Patient Name: Len Goss  Patient MRN: [id number]  Patient YOB: 1960  Thursday, May 12, 2022  Tai Barrios MD  Dear patient,  As a result of recent federal legislation (The Federal Cures Act), you may   receive lab or pathology results from your procedure in your [facility]   account before your physician is able to contact you. Your physician or   their representative will relay the results to you with their   recommendations at their soonest availability.  Thank you,  RESTRICTIONS:  During your procedure today, you received medications for sedation.  These   medications may affect your judgment, balance and coordination.  Therefore,   for 24 hours, you have the following restrictions:   - DO NOT drive a car, operate machinery, make legal/financial decisions,   sign important papers or drink alcohol.    ACTIVITY:  Today: no heavy lifting, straining or running due to procedural   sedation/anesthesia.  The following day: return to full activity including work.  DIET:  Eat and drink normally unless instructed otherwise.     TREATMENT FOR COMMON SIDE EFFECTS:  - Mild abdominal pain, nausea, belching, bloating or excessive gas:  rest,   eat lightly and use a heating pad.  - Sore Throat: treat with throat lozenges and/or gargle with warm salt   water.  - Because air was used during the procedure, expelling large amounts of air   from your rectum or belching is normal.  - If a bowel prep was taken, you may not have a bowel movement for 1-3 days.    This is normal.  SYMPTOMS TO WATCH FOR AND REPORT TO YOUR PHYSICIAN:  1. Abdominal pain or bloating, other than gas cramps.  2. Chest pain.  3. Back pain.  4. Signs of infection such as: chills or fever occurring within 24 hours   after the procedure.  5. Rectal bleeding, which would show as bright red, maroon, or black stools.   (A tablespoon of blood from the rectum is not serious, especially  if   hemorrhoids are present.)  6. Vomiting.  7. Weakness or dizziness.  GO DIRECTLY TO THE NEAREST EMERGENCY ROOM IF YOU HAVE ANY OF THE FOLLOWING:      Difficulty breathing              Chills and/or fever over 101 F   Persistent vomiting and/or vomiting blood   Severe abdominal pain   Severe chest pain   Black, tarry stools   Bleeding- more than one tablespoon   Any other symptom or condition that you feel may need urgent attention  Your doctor recommends these additional instructions:  If any biopsies were taken, your doctors clinic will contact you in 1 to 2   weeks with any results.  - Discharge patient to home (ambulatory).   - Resume previous diet today.   - Repeat colonoscopy in 10 years for screening purposes.  For questions, problems or results please call your physician - Tai Barrios MD at Work:  [phone].  [facility] , EMERGENCY ROOM PHONE NUMBER: [phone]  IF A COMPLICATION OR EMERGENCY SITUATION ARISES AND YOU ARE UNABLE TO REACH   YOUR PHYSICIAN - GO DIRECTLY TO THE EMERGENCY ROOM.  MD Tai Wilson MD  5/12/2022 12:10:14 PM  This report has been verified and signed electronically.  Dear patient,  As a result of recent federal legislation (The Federal Cures Act), you may   receive lab or pathology results from your procedure in your [facility]   account before your physician is able to contact you. Your physician or   their representative will relay the results to you with their   recommendations at their soonest availability.  Thank you,  PROVATION

## 2022-05-12 NOTE — TRANSFER OF CARE
"Anesthesia Transfer of Care Note    Patient: Len Goss    Procedure(s) Performed: Procedure(s) (LRB):  COLONOSCOPY (N/A)    Patient location: GI    Anesthesia Type: general    Transport from OR: Transported from OR on room air with adequate spontaneous ventilation    Post pain: adequate analgesia    Post assessment: no apparent anesthetic complications    Post vital signs: stable    Level of consciousness: sedated    Nausea/Vomiting: no nausea/vomiting    Complications: none    Transfer of care protocol was followed      Last vitals:   Visit Vitals  BP (!) 147/76   Pulse (!) 56   Temp 36.5 °C (97.7 °F) (Oral)   Resp 18   Ht 6' 1" (1.854 m)   Wt 72.5 kg (159 lb 13.3 oz)   SpO2 100%   BMI 21.09 kg/m²     "

## 2022-05-16 VITALS
RESPIRATION RATE: 18 BRPM | DIASTOLIC BLOOD PRESSURE: 77 MMHG | TEMPERATURE: 98 F | SYSTOLIC BLOOD PRESSURE: 144 MMHG | HEART RATE: 62 BPM | OXYGEN SATURATION: 100 % | HEIGHT: 73 IN | BODY MASS INDEX: 21.18 KG/M2 | WEIGHT: 159.81 LBS

## 2022-07-01 ENCOUNTER — LAB VISIT (OUTPATIENT)
Dept: LAB | Facility: HOSPITAL | Age: 62
End: 2022-07-01
Attending: NURSE PRACTITIONER
Payer: MEDICARE

## 2022-07-01 DIAGNOSIS — N18.2 STAGE 2 CHRONIC KIDNEY DISEASE: Primary | ICD-10-CM

## 2022-07-01 DIAGNOSIS — D64.9 MILD ANEMIA: ICD-10-CM

## 2022-07-01 LAB
ANION GAP SERPL CALC-SCNC: 9 MEQ/L
BASOPHILS # BLD AUTO: 0.06 X10(3)/MCL (ref 0–0.2)
BASOPHILS NFR BLD AUTO: 1 %
BUN SERPL-MCNC: 19 MG/DL (ref 8.4–25.7)
CALCIUM SERPL-MCNC: 9.5 MG/DL (ref 8.8–10)
CHLORIDE SERPL-SCNC: 107 MMOL/L (ref 98–107)
CO2 SERPL-SCNC: 27 MMOL/L (ref 23–31)
CREAT SERPL-MCNC: 1.28 MG/DL (ref 0.73–1.18)
CREAT/UREA NIT SERPL: 15
EOSINOPHIL # BLD AUTO: 0.12 X10(3)/MCL (ref 0–0.9)
EOSINOPHIL NFR BLD AUTO: 2 %
ERYTHROCYTE [DISTWIDTH] IN BLOOD BY AUTOMATED COUNT: 12.8 % (ref 11.5–17)
FERRITIN SERPL-MCNC: 276.32 NG/ML (ref 21.81–274.66)
GLUCOSE SERPL-MCNC: 100 MG/DL (ref 82–115)
HCT VFR BLD AUTO: 40.2 % (ref 42–52)
HGB BLD-MCNC: 13.1 GM/DL (ref 14–18)
IMM GRANULOCYTES # BLD AUTO: 0.01 X10(3)/MCL (ref 0–0.04)
IMM GRANULOCYTES NFR BLD AUTO: 0.2 %
LYMPHOCYTES # BLD AUTO: 1.56 X10(3)/MCL (ref 0.6–4.6)
LYMPHOCYTES NFR BLD AUTO: 26.5 %
MCH RBC QN AUTO: 32.1 PG (ref 27–31)
MCHC RBC AUTO-ENTMCNC: 32.6 MG/DL (ref 33–36)
MCV RBC AUTO: 98.5 FL (ref 80–94)
MONOCYTES # BLD AUTO: 0.73 X10(3)/MCL (ref 0.1–1.3)
MONOCYTES NFR BLD AUTO: 12.4 %
NEUTROPHILS # BLD AUTO: 3.4 X10(3)/MCL (ref 2.1–9.2)
NEUTROPHILS NFR BLD AUTO: 57.9 %
NRBC BLD AUTO-RTO: 0 %
PLATELET # BLD AUTO: 171 X10(3)/MCL (ref 130–400)
PMV BLD AUTO: 12.4 FL (ref 7.4–10.4)
POTASSIUM SERPL-SCNC: 4 MMOL/L (ref 3.5–5.1)
RBC # BLD AUTO: 4.08 X10(6)/MCL (ref 4.7–6.1)
SODIUM SERPL-SCNC: 143 MMOL/L (ref 136–145)
WBC # SPEC AUTO: 5.9 X10(3)/MCL (ref 4.5–11.5)

## 2022-07-01 PROCEDURE — 82728 ASSAY OF FERRITIN: CPT

## 2022-07-01 PROCEDURE — 36415 COLL VENOUS BLD VENIPUNCTURE: CPT

## 2022-07-01 PROCEDURE — 80048 BASIC METABOLIC PNL TOTAL CA: CPT

## 2022-07-01 PROCEDURE — 85025 COMPLETE CBC W/AUTO DIFF WBC: CPT

## 2022-07-07 PROBLEM — I10 HYPERTENSION: Status: ACTIVE | Noted: 2022-07-07

## 2022-07-07 PROBLEM — J44.9 SEVERE CHRONIC OBSTRUCTIVE PULMONARY DISEASE: Status: ACTIVE | Noted: 2022-07-07

## 2022-07-07 PROBLEM — R73.03 PREDIABETES: Status: ACTIVE | Noted: 2022-07-07

## 2022-07-07 PROBLEM — E78.2 MIXED HYPERLIPIDEMIA: Status: ACTIVE | Noted: 2022-07-07

## 2022-07-07 PROBLEM — Z86.79 HISTORY OF ARTERIOSCLEROTIC CARDIOVASCULAR DISEASE: Status: ACTIVE | Noted: 2022-07-07

## 2022-07-07 PROBLEM — D64.9 ANEMIA: Status: ACTIVE | Noted: 2022-07-07

## 2022-07-07 PROBLEM — N18.2 STAGE 2 CHRONIC KIDNEY DISEASE: Status: ACTIVE | Noted: 2022-07-07

## 2022-07-07 PROBLEM — D52.0 DIETARY FOLATE DEFICIENCY ANEMIA: Status: ACTIVE | Noted: 2022-07-07

## 2022-07-07 NOTE — ASSESSMENT & PLAN NOTE
Continue DuoNebs/ProAir PRN, Stiolto as scheduled. Continues to report CRUZ such as ambulation > 100 ft, mowing lawn, etc but symptoms managed well with O2  Continue O2 therapy

## 2022-07-07 NOTE — PROGRESS NOTES
ZACARIAS Cook   [facility]  [address] [address]      PATIENT NAME: Len Goss  : 1960  DATE: 22  MRN: [id number]      Billing Provider: ZACARIAS Cook  Level of Service:   Patient PCP Information     Provider PCP Type    ZACARIAS Cook General          Reason for Visit / Chief Complaint: Follow-up (Lab review)       History of Present Illness / Problem Focused Workflow     Len Goss presents to the clinic with Follow-up (Lab review)     (19): Pt presenting to clinic for f/u HTN, HLD, and COPD. Bp at goal. Bp managed with Amlodipine 5 mg po daily and Benazepril 40 mg total po daily. LDL 18. Currently taking Atorvastatin 10 mg po daily. COPD managed with Stiolto and Duonebs as needed (i.s., SOB, wheeze, bad cough). PFTs 1/3/19 revealed: severe obstruction, no BD response, increased RV, and moderate reduction in diffusion. Pt does admit SOB with moderate exertion. He states that he has no issues at rest. Hx of tobacco use. Denies current use. Carotid US-Calcified plaque in the carotid bifurcation. LE Arterial US negative. HgA1c 5.7%. Denies fever, chills, CP, cough, wheeze, or SOB at present. No other problems stated.    4/15/19: Pt presenting for f/u. Recently seen in Cardio Clinic (19) for carotid stenosis, atypical CP. Further testing pending. Pt questioning when Echo will be scheduled. Apparently no order was entered per Cardio. He is planning on going to Cardio today to ask about Echo. Bp at goal. Bp managed with Amlodipine 5 mg po daily and Benazepril 40 mg total po daily. LDL 18. Currently taking Atorvastatin 10 mg po daily. COPD managed with Stiolto and Duonebs as needed (i.s., SOB, wheeze, bad cough). Pulm appt scheduled 19. Renal indices stable. Denies fever, chills, CP, cough, wheeze, or SOB at present. Denies abd pain or dysuria. No other problems stated.    (10/16/19): Pt  "presenting for 6-mth f/u. Accompanied by spouse, Kennedi. PmHx of HTN, HLD, Carotid Stenosis, Hx of abnl stress test, severe COPD. Following Cardio. Last OV 6/25/19. Following Pulm clinic for severe COPD. No recent exacerbations. Last visit in Pul 4/30/19. Low dose CT Chest 05/22/19 negative for lung ca. He does endorse dyspnea with exertion but reports full recovery after a rest break. Pt was seen in Cardio Clinic 10/8/19 for 3-mth f/u angiogram (7/12/19). States that "no blockages" found but that "my heart wasn't pumping right." States Cardiologist told  him to continue his current medications for now. He also added a BB and nitrate. He continues with bilateral leg pain. Cardio has stopped Lipitor for now to determine if this is contributing to leg pain. He will also have a LE Art US on 1/6/2020.   Lab review:   Renal indices stable   HgA1c 5.9%   LDL 35   Mild anemia, otherwise, CBC unremarkable  Denies fever, chills, CP, cough, wheeze, or SOB at present, abd pain, dysuria, or LE edema. No other problems stated.    (1/16/2020): Pt presenting for 3-mth f/u. Accompanied by spouse, Kennedi. PmHx of HTN, HLD, CKD 2, Carotid Stenosis, Hx of abnl stress test/CP, severe COPD. Following Cardio. Last OV 10/15/19. F/u 7/7/2020. Previously following Pulm clinic for severe COPD. No recent exacerbations. Last visit in Pul 10/29/19. He was discharged from Pulm clinic. Low dose CT Chest 05/22/19 negative for lung ca. LDL 84. Renal indices stable. Mild anemia stable. Hgb 13.1; HCT 41.0. FIT (1/4/2020) negative. PSA 1.7. Pt reports that he's feeling well. No acute concerns today.    (7/7/2020): Mr. Harrington is a 58 yo  male with a PmHx of HTN, HLD, CKD 2, Carotid Stenosis, Hx of abnl stress test/CP, and severe COPD, presenting for 3-mth f/u. Accompanied by spouse, Kennedi Goss. Following Cardio. Scheduled to f/u today, 7/7/2020. Previously following Pulm clinic for severe COPD. No recent exacerbations. Last visit in Pulm " "10/29/19 and he was discharged from Pulm clinic. He continues with his nebs PRN and Stiolto. He does have dyspnea with extreme exertion. Not at rest. Not worse from baseline. Denies cough or wheeze. Low dose CT Chest 05/22/19 negative for lung ca. He is due for repeat annual screening. LDL 84. Taken off of statin in the past due to leg pain but states pain about the same. Renal indices stable. Mild anemia stable. FIT (1/4/2020) negative. PSA 1.7. Pt reports that he's feeling well. No acute concerns today.    (1/7/2021): Mr. Harrington is a 59 yo  male with a PmHx of HTN, HLD, CKD 2, Carotid Stenosis, Hx of abnl stress test/CP (resolved), and severe stage 3 COPD, presenting for routine f/u. He is following Cards for carotid stenosis/CP. Last visit 7/2020. To f/u next month month. Denies chest pain, weakness, dizziness, syncope.   Lab review:  Renal indices stable   HgA1c 5.2%   PSA 2.04   Total cholesterol 192, HDL 90, LDL 80   TSH   Mild dec in total RBC, H/H   FIT negative 1/4/2020, due now. He denies any changes in stool consistency or bowel patterns. Has not had a colonoscopy and not interested.   LDCT for lung cancer screening 8/2020: benign.    Cardiac tests:    Echo (7/29/2019) EF 50 to 55%.   Coronary angiogram (7/12/2019) normal coronaries   Ultrasound the lower extremities (1.6.20)    The Doppler waveforms are multiphasic at the ankle bilaterally    The resting ABIs are normal    The post-stress ABIs are normal  No evidence of significant arterial insufficiency was identified on the study     He reports previously taken off of statin by Cards d/t past c/o leg pain. States although he's been off for ~1 year, he remains with bilateral aching upper leg pain that is worse with prolonged ambulation or climbing stairs/ladders. States pain has been ongoing for years. Had a work-related injury in the 90s that resulted in him being "slung 70 feet" in the air and landing in some trees. He admits following a " "chiropractor at that time and was told he had a fracture in right hip. Never had surgery. Pain relieved with rest. No imaging on file.       He has no acute concerns today.    (7/7/2021): Mr. Harrington is a 61 yo  male with a PmHx of HTN, HLD, CKD 2, Carotid Stenosis, Hx of abnl stress test/CP (resolved), and severe stage 3 COPD, presenting for routine f/u. He is following Cards for carotid stenosis/CP. F/u 8/17/21.   Lab review:   BUN 26.2/Creatinine 1.23   eGFR 64 mL/min   Ferritin 301.60   Folate level 5.5, low   Vitamin B12 1016  RBC count 3.89   H/H 12.4/38.2   +FIT 1/2021. Referred to GI lab.   Pt underwent XR right femur 1/2021 due to c/o leg pain. XR concerning for bone lesion. F/u imaging revealed 2/19/21:  "A 3.3 cm x 2.5 cm x 2.1 cm intraosseous lesion is present to the   intratrochanteric region of the right femur. 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 He was referred to Dr. Pradeep Chaudhry in [facility] for further eval. No appt received.      (8/17/2021): Mr. Harrington is a 61 yo  male with a PmHx of HTN, HLD, CKD 2, Carotid Stenosis, Hx of abnl stress test/CP (resolved), and severe stage 3 COPD, presenting for f/u for 6-min walk test due to previous reports of dyspnea on exertion. States most SOB when ambulating for long distances > 100 ft. Tries to walk q evening with wife in the yard. However, he states, "I can only do two laps to her 10."  He normally requires a neb tx after he walks in the evening. He underwent LDCT yesterday that was benign. He has a cardiology appt this am. No other concerns.    (1/7/2022): Mr. Harrington is a 60 yo  male with a PmHx of HTN, HLD, CKD 2, Carotid Stenosis, Hx of abnl stress test/CP (resolved), and severe stage 3 COPD, presenting for f/u. Since last visit, pt now has O2 for PRN use dyspnea 2/2 severe COPD. He currently has a portable cylinder. States O2 has helped his dyspnea and fatigue " tremendously. He even reports that leg pain is diminished since starting O2 therapy. He was previously referred to Dr. 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 Patient admits that he was contacted for an appt. [facility] was told he would need to be vaccinated against COVID in order to be seen at the clinic. He is declining the vaccination and prefers not to pursue further evaluation of the lesion at this time. He will notify the clinic in the future if he changes his mind. He continues to follow cards. Scheduled for colonoscopy 1/20/22 2/2 h/o +FIT. No recent labs. [facility] has lab work due to Cards in the next few weeks; plans to complete labs then. Only requesting medication refills. SBP slightly decreased. Asymptomatic. Declines vaccines. No acute concerns.        Today's Visit (7/8/2022): Mr. Harrington is a 62 yo  male with a PmHx of HTN, HLD, CKD 2, Carotid Stenosis, Hx of abnl stress test/CP (resolved), and severe stage 3 COPD, presenting for 6-month f/u. Patient is s/p colonoscopy 5/12/22 that revealed:  Diverticulosis in the sigmoid colon, in the                          descending colon and in the ascending colon.                          - No specimens collected.                          - The entire examined colon is normal. (All                          Maneuvers).   Recommendation: Repeat colonoscopy in 10 years for screening                          purposes.   Labs reviewed and stable with creatinine slightly increased. Patient has been working outside in the heat and not drinking a lot of water per report. LDCT due next month. Requesting medication refills. He has no other concerns.       Review of Systems     Review of Systems   Constitutional: Negative.    HENT: Negative.    Eyes: Negative.    Respiratory: Positive for shortness of breath.    Cardiovascular: Negative.    Gastrointestinal: Negative.    Endocrine: Negative.    Genitourinary:  Negative.    Musculoskeletal: Negative.    Skin: Negative.    Allergic/Immunologic: Negative.    Neurological: Negative.    Hematological: Negative.    Psychiatric/Behavioral: Negative.        Medical / Social / Family History     Past Medical History:   Diagnosis Date    Anemia, unspecified     Cardiovascular disease     COPD (chronic obstructive pulmonary disease)     Hypertension     Mixed hyperlipidemia     Prediabetes     Stage 2 chronic kidney disease        Past Surgical History:   Procedure Laterality Date    COLONOSCOPY N/A 5/12/2022    Procedure: COLONOSCOPY;  Surgeon: Tai Barrios MD;  Location: [facility] ENDOSCOPY;  Service: Endoscopy;  Laterality: N/A;       Social History    reports that he quit smoking about 7 years ago. He quit after 6.00 years of use. He has never used smokeless tobacco. He reports current alcohol use of about 2.0 - 4.0 standard drinks of alcohol per week. He reports that he does not use drugs.    Family History  's family history includes Diabetes type II in his mother; Hypertension in his father; Lung cancer in his mother.    Medications and Allergies     Medications  Medication List with Changes/Refills   Current Medications    ALBUTEROL-IPRATROPIUM (DUO-NEB) 2.5 MG-0.5 MG/3 ML NEBULIZER SOLUTION    Inhale 3 mLs into the lungs every 4 to 6 hours as needed for Wheezing or Shortness of Breath.    AMLODIPINE (NORVASC) 10 MG TABLET    Take 10 mg by mouth Daily.    ASPIRIN (ECOTRIN) 81 MG EC TABLET    Take 81 mg by mouth Daily.    ATORVASTATIN (LIPITOR) 10 MG TABLET    Take 10 mg by mouth every evening.    BENAZEPRIL (LOTENSIN) 40 MG TABLET    Take 40 mg by mouth once daily.    ISOSORBIDE MONONITRATE (IMDUR) 30 MG 24 HR TABLET    Take 30 mg by mouth.    METOPROLOL SUCCINATE (TOPROL-XL) 25 MG 24 HR TABLET    Take 25 mg by mouth Daily.    NITROGLYCERIN (NITROSTAT) 0.4 MG SL TABLET    Place 0.4 mg under the tongue every 5 (five) minutes as needed for Chest pain.   Changed  and/or Refilled Medications    Modified Medication Previous Medication    ALBUTEROL (PROVENTIL/VENTOLIN HFA) 90 MCG/ACTUATION INHALER albuterol (PROVENTIL/VENTOLIN HFA) 90 mcg/actuation inhaler       Inhale 2 puffs into the lungs every 6 (six) hours as needed for Wheezing or Shortness of Breath. Rescue    Inhale 2 puffs into the lungs every 6 (six) hours as needed for Wheezing. Rescue    FOLIC ACID (FOLVITE) 1 MG TABLET folic acid (FOLVITE) 1 MG tablet       Take 1 tablet (1 mg total) by mouth Daily.    Take 1 mg by mouth Daily.    SILDENAFIL (VIAGRA) 50 MG TABLET sildenafiL (VIAGRA) 50 MG tablet       Take 1 tablet (50 mg total) by mouth daily as needed for Erectile Dysfunction.    Take 50 mg by mouth.    TIOTROPIUM-OLODATEROL (STIOLTO RESPIMAT) 2.5-2.5 MCG/ACTUATION MIST tiotropium-olodateroL (STIOLTO RESPIMAT) 2.5-2.5 mcg/actuation Mist       Inhale 2 puffs into the lungs once daily.    Inhale 2 puffs into the lungs.   Discontinued Medications    ALBUTEROL-IPRATROPIUM (DUO-NEB) 2.5 MG-0.5 MG/3 ML NEBULIZER SOLUTION    Take 3 mLs by nebulization every 6 (six) hours as needed for Wheezing. Rescue    AMLODIPINE (NORVASC) 10 MG TABLET    Take 10 mg by mouth once daily.    ASPIRIN (ECOTRIN) 81 MG EC TABLET    Take 81 mg by mouth once daily.    ATORVASTATIN (LIPITOR) 10 MG TABLET    Take 10 mg by mouth once daily.    BENAZEPRIL (LOTENSIN) 40 MG TABLET    Take 40 mg by mouth once daily.    FOLIC ACID (FOLVITE) 1 MG TABLET    Take 1 mg by mouth once daily.    ISOSORBIDE MONONITRATE (IMDUR) 30 MG 24 HR TABLET    Take 30 mg by mouth once daily.    METOPROLOL SUCCINATE (TOPROL-XL) 25 MG 24 HR TABLET    Take 25 mg by mouth once daily.    TIOTROPIUM BR/OLODATEROL HCL (STIOLTO RESPIMAT INHL)    Inhale 2 puffs into the lungs every 24 hours as needed (wheezing).       Allergies  Review of patient's allergies indicates:   Allergen Reactions    Penicillins Swelling    Penicillin      Other reaction(s): Swelling       Physical  Examination     Vitals:    07/08/22 0825   BP: 129/76   Pulse: 64   Resp: 20   Temp: 97.6 °F (36.4 °C)     Physical Exam  Constitutional:       Appearance: Normal appearance.   HENT:      Head: Normocephalic and atraumatic.      Nose: Nose normal.      Mouth/Throat:      Mouth: Mucous membranes are moist.   Eyes:      Extraocular Movements: Extraocular movements intact.      Conjunctiva/sclera: Conjunctivae normal.      Pupils: Pupils are equal, round, and reactive to light.   Neck:      Vascular: No carotid bruit.   Cardiovascular:      Rate and Rhythm: Normal rate and regular rhythm.      Pulses: Normal pulses.      Heart sounds: Normal heart sounds.   Pulmonary:      Effort: Pulmonary effort is normal.      Breath sounds: Examination of the right-lower field reveals decreased breath sounds. Examination of the left-lower field reveals decreased breath sounds. Decreased breath sounds present.   Abdominal:      General: Bowel sounds are normal.      Palpations: Abdomen is soft.   Musculoskeletal:         General: Normal range of motion.      Cervical back: Normal range of motion.   Skin:     General: Skin is warm and dry.   Neurological:      General: No focal deficit present.      Mental Status: He is alert and oriented to person, place, and time.   Psychiatric:         Mood and Affect: Mood normal.         Behavior: Behavior normal.         Thought Content: Thought content normal.         Judgment: Judgment normal.           Results     Lab Results   Component Value Date    WBC 5.9 07/01/2022    RBC 4.08 (L) 07/01/2022    HGB 13.1 (L) 07/01/2022    HCT 40.2 (L) 07/01/2022    MCV 98.5 (H) 07/01/2022    MCH 32.1 (H) 07/01/2022    MCHC 32.6 (L) 07/01/2022    RDW 12.8 07/01/2022     07/01/2022    MPV 12.4 (H) 07/01/2022     CMP  Sodium Level   Date Value Ref Range Status   07/01/2022 143 136 - 145 mmol/L Final     Potassium Level   Date Value Ref Range Status   07/01/2022 4.0 3.5 - 5.1 mmol/L Final     Carbon  Dioxide   Date Value Ref Range Status   07/01/2022 27 23 - 31 mmol/L Final     Blood Urea Nitrogen   Date Value Ref Range Status   07/01/2022 19.0 8.4 - 25.7 mg/dL Final     Creatinine   Date Value Ref Range Status   07/01/2022 1.28 (H) 0.73 - 1.18 mg/dL Final     Calcium Level Total   Date Value Ref Range Status   07/01/2022 9.5 8.8 - 10.0 mg/dL Final     Albumin Level   Date Value Ref Range Status   01/19/2022 4.0 3.4 - 4.8 gm/dL Final     Bilirubin Total   Date Value Ref Range Status   01/19/2022 0.3 <<=1.5 mg/dL Final     Alkaline Phosphatase   Date Value Ref Range Status   01/19/2022 94 40 - 150 unit/L Final     Aspartate Aminotransferase   Date Value Ref Range Status   01/19/2022 18 5 - 34 unit/L Final     Alanine Aminotransferase   Date Value Ref Range Status   01/19/2022 20 0 - 55 unit/L Final     Estimated GFR-Non    Date Value Ref Range Status   07/01/2022 >60 mls/min/1.73/m2 Final     Lab Results   Component Value Date    CHOL 155 01/19/2022     Lab Results   Component Value Date    HDL 75 (H) 01/19/2022     No results found for: LDLCALC  Lab Results   Component Value Date    TRIG 117 01/19/2022     No results found for: CHOLHDL  Lab Results   Component Value Date    TSH 1.8458 01/19/2022     Lab Results   Component Value Date    PHUR 7.5 01/19/2022    PROTEINUA Negative 01/19/2022    GLUCUA Normal 01/19/2022    KETONESU Negative 01/19/2022    OCCULTUA Negative 01/19/2022    NITRITE Negative 01/19/2022    LEUKOCYTESUR Negative 01/19/2022           Assessment and Plan (including Health Maintenance)     Plan:         Health Maintenance Due   Topic Date Due    Hepatitis C Screening  Never done    COVID-19 Vaccine (1) Never done    Pneumococcal Vaccines (Age 0-64) (1 - PCV) Never done    TETANUS VACCINE  Never done    Shingles Vaccine (1 of 2) Never done       Problem List Items Addressed This Visit        Pulmonary    Severe chronic obstructive pulmonary disease    Overview     PFTs  1/2019 FEV1 42 with FEV1/FEV ratio 44  8/2021 annual lung cancer screening- benign  Continue smoking cessation (quit 5 years ago)             Current Assessment & Plan     Continue DuoNebs/ProAir PRN, Stiolto as scheduled. Continues to report CRUZ such as ambulation > 100 ft, mowing lawn, etc but symptoms managed well with O2  Continue O2 therapy           Relevant Medications    albuterol (PROVENTIL/VENTOLIN HFA) 90 mcg/actuation inhaler    tiotropium-olodateroL (STIOLTO RESPIMAT) 2.5-2.5 mcg/actuation Mist       Cardiac/Vascular    History of arteriosclerotic cardiovascular disease    Overview     Lexiscan 2019 with small fixed inferior perfusion defect  Left heart cath 7/2019 was unremarkable  TTE 8 in 2019 unremarkable with normal EF of 50 to 55%             Hypertension - Primary    Current Assessment & Plan     At goal  Continue meds  DASH              Renal/    Stage 2 chronic kidney disease    Current Assessment & Plan     Avoid NSAIDs (i.e., Advil, Aleve, Ibuprofen, Motrin, Naproxen, Diclofenac, Indocin, Celebrex, Mobic, Voltaren). Avoid the following GI meds: Milk of Mag, Mylanta, Fleets Enema, and Pepto-Bismol. Okay to take Tylenol (acetaminophen) (if no end-stage liver disease), low dose ASA (81 mg), Miralax, Tums, and Colace. Increase clear fluid intake. Avoid dark sodas.             Relevant Orders    CBC Auto Differential    Comprehensive Metabolic Panel    Urinalysis, Reflex to Urine Culture Urine, Clean Catch    Erectile dysfunction    Current Assessment & Plan     Rx: Viagra PRN. Tolerates well   The treatment for ED starts with taking care of your heart and vascular health:   Improve your eating habits (like eating more plant-based foods, and limiting high-fat or processed foods)    Maintain a healthy weight    Stop smoking, if applicable   Increase exercise    Limit drugs and alcohol    Sleep more (ideally 7-8 hours per night)      Oral Drugs   Oral drugs known as PDE type-5 inhibitors  increase penile blood flow. These are drugs that are taken as a pill by mouth.  The only oral agents approved in the [place] by the Food and Drug Administration for ED are:    Viagra® (sildenafil citrate)    Levitra® (vardenafil HCl)    Cialis® (tadalafil)    Stendra® (avanafil)   For best results, men should take these pills about an hour or two before having sex. PDE-5 inhibitors improve blood flow to create a strong erection. To work, they require normal nerve function to the penis. Response rates are generally lower for people with diabetes or cancer  If you are taking nitrates for your heart, you SHOULD NOT take any PDE-5 inhibitors due to potentially dangerous hypotension.     Most often, the side effects of PDE-5 inhibitors are mild and usually last only a short while. The most common side effects include:    Facial flushing    Headache    Indigestion    Muscle aches    Stuffy nose   Most of the side effects linked to PDE-5 inhibitors are related to other tissues in the body because these drugs increase blood flow to your penis, so they can also impact other vascular tissues.               Relevant Medications    sildenafiL (VIAGRA) 50 MG tablet       Oncology    Dietary folate deficiency anemia    Current Assessment & Plan     CBC stable. Continue folic acid  Labs prior to f/u to monitor           Relevant Medications    folic acid (FOLVITE) 1 MG tablet    Other Relevant Orders    Path Review, Peripheral Smear       Endocrine    Prediabetes    Current Assessment & Plan     A1c at f/u           Relevant Orders    Hemoglobin A1C       Other    Ex-smoker    Relevant Orders    CT Chest Lung Screening Low Dose      Other Visit Diagnoses     Screening for prostate cancer        Relevant Orders    PSA, Screening    Hyperlipidemia, unspecified hyperlipidemia type        Relevant Orders    Lipid Panel    Wellness examination              Health Maintenance Topics with due status: Not Due       Topic Last  Completion Date    PROSTATE-SPECIFIC ANTIGEN 01/19/2022    Lipid Panel 01/19/2022    Colorectal Cancer Screening 05/12/2022    Influenza Vaccine Not Due       Future Appointments   Date Time Provider Department Center   1/9/2023  8:30 AM [facility]             Signature:  ZACARIAS Cook  [facility]  [address] [address]    Date of encounter: 7/8/22

## 2022-07-08 ENCOUNTER — OFFICE VISIT (OUTPATIENT)
Dept: INTERNAL MEDICINE | Facility: CLINIC | Age: 62
End: 2022-07-08
Payer: MEDICARE

## 2022-07-08 VITALS
SYSTOLIC BLOOD PRESSURE: 129 MMHG | BODY MASS INDEX: 21.47 KG/M2 | HEART RATE: 64 BPM | WEIGHT: 162 LBS | RESPIRATION RATE: 20 BRPM | DIASTOLIC BLOOD PRESSURE: 76 MMHG | HEIGHT: 73 IN | TEMPERATURE: 98 F

## 2022-07-08 DIAGNOSIS — Z87.891 EX-SMOKER: ICD-10-CM

## 2022-07-08 DIAGNOSIS — R73.03 PREDIABETES: ICD-10-CM

## 2022-07-08 DIAGNOSIS — N52.9 ERECTILE DYSFUNCTION, UNSPECIFIED ERECTILE DYSFUNCTION TYPE: ICD-10-CM

## 2022-07-08 DIAGNOSIS — E78.5 HYPERLIPIDEMIA, UNSPECIFIED HYPERLIPIDEMIA TYPE: ICD-10-CM

## 2022-07-08 DIAGNOSIS — Z00.00 WELLNESS EXAMINATION: ICD-10-CM

## 2022-07-08 DIAGNOSIS — Z86.79 HISTORY OF ARTERIOSCLEROTIC CARDIOVASCULAR DISEASE: ICD-10-CM

## 2022-07-08 DIAGNOSIS — Z12.5 SCREENING FOR PROSTATE CANCER: ICD-10-CM

## 2022-07-08 DIAGNOSIS — I10 HYPERTENSION, UNSPECIFIED TYPE: Primary | ICD-10-CM

## 2022-07-08 DIAGNOSIS — J44.9 SEVERE CHRONIC OBSTRUCTIVE PULMONARY DISEASE: ICD-10-CM

## 2022-07-08 DIAGNOSIS — N18.2 STAGE 2 CHRONIC KIDNEY DISEASE: ICD-10-CM

## 2022-07-08 DIAGNOSIS — D52.0 DIETARY FOLATE DEFICIENCY ANEMIA: ICD-10-CM

## 2022-07-08 PROCEDURE — 1159F MED LIST DOCD IN RCRD: CPT | Mod: CPTII,,, | Performed by: NURSE PRACTITIONER

## 2022-07-08 PROCEDURE — 99214 PR OFFICE/OUTPT VISIT, EST, LEVL IV, 30-39 MIN: ICD-10-PCS | Mod: S$PBB,,, | Performed by: NURSE PRACTITIONER

## 2022-07-08 PROCEDURE — 3008F PR BODY MASS INDEX (BMI) DOCUMENTED: ICD-10-PCS | Mod: CPTII,,, | Performed by: NURSE PRACTITIONER

## 2022-07-08 PROCEDURE — 1160F RVW MEDS BY RX/DR IN RCRD: CPT | Mod: CPTII,,, | Performed by: NURSE PRACTITIONER

## 2022-07-08 PROCEDURE — 4010F ACE/ARB THERAPY RXD/TAKEN: CPT | Mod: CPTII,,, | Performed by: NURSE PRACTITIONER

## 2022-07-08 PROCEDURE — 3074F SYST BP LT 130 MM HG: CPT | Mod: CPTII,,, | Performed by: NURSE PRACTITIONER

## 2022-07-08 PROCEDURE — 4010F PR ACE/ARB THEARPY RXD/TAKEN: ICD-10-PCS | Mod: CPTII,,, | Performed by: NURSE PRACTITIONER

## 2022-07-08 PROCEDURE — 99215 OFFICE O/P EST HI 40 MIN: CPT | Mod: PBBFAC | Performed by: NURSE PRACTITIONER

## 2022-07-08 PROCEDURE — 1160F PR REVIEW ALL MEDS BY PRESCRIBER/CLIN PHARMACIST DOCUMENTED: ICD-10-PCS | Mod: CPTII,,, | Performed by: NURSE PRACTITIONER

## 2022-07-08 PROCEDURE — 3008F BODY MASS INDEX DOCD: CPT | Mod: CPTII,,, | Performed by: NURSE PRACTITIONER

## 2022-07-08 PROCEDURE — 3078F DIAST BP <80 MM HG: CPT | Mod: CPTII,,, | Performed by: NURSE PRACTITIONER

## 2022-07-08 PROCEDURE — 1159F PR MEDICATION LIST DOCUMENTED IN MEDICAL RECORD: ICD-10-PCS | Mod: CPTII,,, | Performed by: NURSE PRACTITIONER

## 2022-07-08 PROCEDURE — 3078F PR MOST RECENT DIASTOLIC BLOOD PRESSURE < 80 MM HG: ICD-10-PCS | Mod: CPTII,,, | Performed by: NURSE PRACTITIONER

## 2022-07-08 PROCEDURE — 3074F PR MOST RECENT SYSTOLIC BLOOD PRESSURE < 130 MM HG: ICD-10-PCS | Mod: CPTII,,, | Performed by: NURSE PRACTITIONER

## 2022-07-08 PROCEDURE — 99214 OFFICE O/P EST MOD 30 MIN: CPT | Mod: S$PBB,,, | Performed by: NURSE PRACTITIONER

## 2022-07-08 RX ORDER — BENAZEPRIL HYDROCHLORIDE 40 MG/1
40 TABLET ORAL DAILY
COMMUNITY
Start: 2021-08-17 | End: 2023-01-30 | Stop reason: SDUPTHER

## 2022-07-08 RX ORDER — AMLODIPINE BESYLATE 10 MG/1
10 TABLET ORAL DAILY
COMMUNITY
Start: 2021-08-17 | End: 2023-01-30 | Stop reason: SDUPTHER

## 2022-07-08 RX ORDER — ALBUTEROL SULFATE 90 UG/1
2 AEROSOL, METERED RESPIRATORY (INHALATION) EVERY 6 HOURS PRN
Qty: 18 G | Refills: 3 | Status: SHIPPED | OUTPATIENT
Start: 2022-07-08 | End: 2023-01-30 | Stop reason: SDUPTHER

## 2022-07-08 RX ORDER — ISOSORBIDE MONONITRATE 30 MG/1
30 TABLET, EXTENDED RELEASE ORAL DAILY
COMMUNITY
Start: 2021-08-17 | End: 2023-01-30 | Stop reason: SDUPTHER

## 2022-07-08 RX ORDER — IPRATROPIUM BROMIDE AND ALBUTEROL SULFATE 2.5; .5 MG/3ML; MG/3ML
3 SOLUTION RESPIRATORY (INHALATION)
COMMUNITY
Start: 2021-07-07 | End: 2023-01-30 | Stop reason: SDUPTHER

## 2022-07-08 RX ORDER — ATORVASTATIN CALCIUM 10 MG/1
10 TABLET, FILM COATED ORAL NIGHTLY
COMMUNITY
Start: 2021-08-17 | End: 2023-01-30 | Stop reason: SDUPTHER

## 2022-07-08 RX ORDER — SILDENAFIL 50 MG/1
50 TABLET, FILM COATED ORAL
COMMUNITY
Start: 2021-08-18 | End: 2022-07-08 | Stop reason: SDUPTHER

## 2022-07-08 RX ORDER — FOLIC ACID 1 MG/1
1 TABLET ORAL DAILY
COMMUNITY
Start: 2022-01-07 | End: 2022-07-08 | Stop reason: SDUPTHER

## 2022-07-08 RX ORDER — FOLIC ACID 1 MG/1
1 TABLET ORAL DAILY
Qty: 30 TABLET | Refills: 11 | Status: SHIPPED | OUTPATIENT
Start: 2022-07-08 | End: 2023-01-30 | Stop reason: SDUPTHER

## 2022-07-08 RX ORDER — SILDENAFIL 50 MG/1
50 TABLET, FILM COATED ORAL DAILY PRN
Qty: 10 TABLET | Refills: 1 | Status: SHIPPED | OUTPATIENT
Start: 2022-07-08 | End: 2022-07-08 | Stop reason: SDUPTHER

## 2022-07-08 RX ORDER — METOPROLOL SUCCINATE 25 MG/1
25 TABLET, EXTENDED RELEASE ORAL DAILY
COMMUNITY
Start: 2021-08-17 | End: 2023-01-30 | Stop reason: SDUPTHER

## 2022-07-08 RX ORDER — TIOTROPIUM BROMIDE AND OLODATEROL 3.124; 2.736 UG/1; UG/1
2 SPRAY, METERED RESPIRATORY (INHALATION) DAILY
Qty: 4 G | Refills: 6 | Status: SHIPPED | OUTPATIENT
Start: 2022-07-08 | End: 2022-07-08 | Stop reason: SDUPTHER

## 2022-07-08 RX ORDER — SILDENAFIL 50 MG/1
50 TABLET, FILM COATED ORAL DAILY PRN
Qty: 10 TABLET | Refills: 1 | Status: SHIPPED | OUTPATIENT
Start: 2022-07-08 | End: 2023-01-30 | Stop reason: SDUPTHER

## 2022-07-08 RX ORDER — ASPIRIN 81 MG/1
81 TABLET ORAL DAILY
COMMUNITY
Start: 2021-08-17 | End: 2023-01-30 | Stop reason: SDUPTHER

## 2022-07-08 RX ORDER — TIOTROPIUM BROMIDE AND OLODATEROL 3.124; 2.736 UG/1; UG/1
2 SPRAY, METERED RESPIRATORY (INHALATION) DAILY
Qty: 4 G | Refills: 6 | Status: SHIPPED | OUTPATIENT
Start: 2022-07-08 | End: 2023-01-30 | Stop reason: SDUPTHER

## 2022-07-08 RX ORDER — TIOTROPIUM BROMIDE AND OLODATEROL 3.124; 2.736 UG/1; UG/1
2 SPRAY, METERED RESPIRATORY (INHALATION)
COMMUNITY
Start: 2022-01-07 | End: 2022-07-08 | Stop reason: SDUPTHER

## 2022-07-08 NOTE — ASSESSMENT & PLAN NOTE
Avoid NSAIDs (i.e., Advil, Aleve, Ibuprofen, Motrin, Naproxen, Diclofenac, Indocin, Celebrex, Mobic, Voltaren). Avoid the following GI meds: Milk of Mag, Mylanta, Fleets Enema, and Pepto-Bismol. Okay to take Tylenol (acetaminophen) (if no end-stage liver disease), low dose ASA (81 mg), Miralax, Tums, and Colace. Increase clear fluid intake. Avoid dark sodas.

## 2022-07-08 NOTE — ASSESSMENT & PLAN NOTE
Rx: Viagra PRN. Tolerates well   The treatment for ED starts with taking care of your heart and vascular health:   Improve your eating habits (like eating more plant-based foods, and limiting high-fat or processed foods)    Maintain a healthy weight    Stop smoking, if applicable   Increase exercise    Limit drugs and alcohol    Sleep more (ideally 7-8 hours per night)      Oral Drugs   Oral drugs known as PDE type-5 inhibitors increase penile blood flow. These are drugs that are taken as a pill by mouth.  The only oral agents approved in the [place] by the Food and Drug Administration for ED are:    Viagra® (sildenafil citrate)    Levitra® (vardenafil HCl)    Cialis® (tadalafil)    Stendra® (avanafil)   For best results, men should take these pills about an hour or two before having sex. PDE-5 inhibitors improve blood flow to create a strong erection. To work, they require normal nerve function to the penis. Response rates are generally lower for people with diabetes or cancer  If you are taking nitrates for your heart, you SHOULD NOT take any PDE-5 inhibitors due to potentially dangerous hypotension.     Most often, the side effects of PDE-5 inhibitors are mild and usually last only a short while. The most common side effects include:    Facial flushing    Headache    Indigestion    Muscle aches    Stuffy nose   Most of the side effects linked to PDE-5 inhibitors are related to other tissues in the body because these drugs increase blood flow to your penis, so they can also impact other vascular tissues.

## 2022-08-19 ENCOUNTER — HOSPITAL ENCOUNTER (OUTPATIENT)
Dept: RADIOLOGY | Facility: HOSPITAL | Age: 62
Discharge: HOME | End: 2022-08-19
Attending: NURSE PRACTITIONER
Payer: MEDICARE

## 2022-08-19 DIAGNOSIS — Z87.891 EX-SMOKER: ICD-10-CM

## 2022-08-19 PROCEDURE — 71271 CT THORAX LUNG CANCER SCR C-: CPT | Mod: TC

## 2022-08-21 ENCOUNTER — TELEPHONE (OUTPATIENT)
Dept: ADMINISTRATIVE | Facility: HOSPITAL | Age: 62
End: 2022-08-21
Payer: MEDICARE

## 2022-08-21 NOTE — TELEPHONE ENCOUNTER
Please inform Mr. Kumar of negative lung cancer screening:    Lung-RADS Category:  2 - Benign Appearance or Behavior - continue annual screening with LDCT in 12 months.     Clinically or potentially clinically significant non lung cancer finding:  None.

## 2022-12-29 ENCOUNTER — LAB VISIT (OUTPATIENT)
Dept: LAB | Facility: HOSPITAL | Age: 62
End: 2022-12-29
Attending: NURSE PRACTITIONER
Payer: MEDICARE

## 2022-12-29 DIAGNOSIS — N18.2 STAGE 2 CHRONIC KIDNEY DISEASE: ICD-10-CM

## 2022-12-29 DIAGNOSIS — R73.03 PREDIABETES: ICD-10-CM

## 2022-12-29 DIAGNOSIS — D52.0 DIETARY FOLATE DEFICIENCY ANEMIA: ICD-10-CM

## 2022-12-29 DIAGNOSIS — E78.5 HYPERLIPIDEMIA, UNSPECIFIED HYPERLIPIDEMIA TYPE: ICD-10-CM

## 2022-12-29 DIAGNOSIS — Z12.5 SCREENING FOR PROSTATE CANCER: ICD-10-CM

## 2022-12-29 LAB
ALBUMIN SERPL-MCNC: 4.1 G/DL (ref 3.4–4.8)
ALBUMIN/GLOB SERPL: 1.2 RATIO (ref 1.1–2)
ALP SERPL-CCNC: 74 UNIT/L (ref 40–150)
ALT SERPL-CCNC: 17 UNIT/L (ref 0–55)
APPEARANCE UR: CLEAR
AST SERPL-CCNC: 24 UNIT/L (ref 5–34)
BACTERIA #/AREA URNS AUTO: NORMAL /HPF
BASOPHILS # BLD AUTO: 0.06 X10(3)/MCL (ref 0–0.2)
BASOPHILS NFR BLD AUTO: 1 %
BILIRUB UR QL STRIP.AUTO: NEGATIVE MG/DL
BILIRUBIN DIRECT+TOT PNL SERPL-MCNC: 0.7 MG/DL
BUN SERPL-MCNC: 14.8 MG/DL (ref 8.4–25.7)
CALCIUM SERPL-MCNC: 9.6 MG/DL (ref 8.8–10)
CHLORIDE SERPL-SCNC: 107 MMOL/L (ref 98–107)
CHOLEST SERPL-MCNC: 177 MG/DL
CHOLEST/HDLC SERPL: 2 {RATIO} (ref 0–5)
CO2 SERPL-SCNC: 27 MMOL/L (ref 23–31)
COLOR UR AUTO: NORMAL
CREAT SERPL-MCNC: 1.2 MG/DL (ref 0.73–1.18)
EOSINOPHIL # BLD AUTO: 0.13 X10(3)/MCL (ref 0–0.9)
EOSINOPHIL NFR BLD AUTO: 2.1 %
ERYTHROCYTE [DISTWIDTH] IN BLOOD BY AUTOMATED COUNT: 12.3 % (ref 11.6–14.4)
EST. AVERAGE GLUCOSE BLD GHB EST-MCNC: 99.7 MG/DL
GFR SERPLBLD CREATININE-BSD FMLA CKD-EPI: >60 MLS/MIN/1.73/M2
GLOBULIN SER-MCNC: 3.4 GM/DL (ref 2.4–3.5)
GLUCOSE SERPL-MCNC: 94 MG/DL (ref 82–115)
GLUCOSE UR QL STRIP.AUTO: NORMAL MG/DL
HBA1C MFR BLD: 5.1 %
HCT VFR BLD AUTO: 37.5 % (ref 42–52)
HDLC SERPL-MCNC: 92 MG/DL (ref 35–60)
HGB BLD-MCNC: 12.6 GM/DL (ref 14–18)
HYALINE CASTS #/AREA URNS LPF: NORMAL /LPF
IMM GRANULOCYTES # BLD AUTO: 0.01 X10(3)/MCL (ref 0–0.04)
IMM GRANULOCYTES NFR BLD AUTO: 0.2 %
KETONES UR QL STRIP.AUTO: NEGATIVE MG/DL
LDLC SERPL CALC-MCNC: 68 MG/DL (ref 50–140)
LEUKOCYTE ESTERASE UR QL STRIP.AUTO: NEGATIVE UNIT/L
LYMPHOCYTES # BLD AUTO: 1.43 X10(3)/MCL (ref 0.6–4.6)
LYMPHOCYTES NFR BLD AUTO: 22.7 %
MCH RBC QN AUTO: 33.1 PG
MCHC RBC AUTO-ENTMCNC: 33.6 MG/DL (ref 33–36)
MCV RBC AUTO: 98.4 FL (ref 80–94)
MONOCYTES # BLD AUTO: 0.71 X10(3)/MCL (ref 0.1–1.3)
MONOCYTES NFR BLD AUTO: 11.3 %
NEUTROPHILS # BLD AUTO: 3.97 X10(3)/MCL (ref 2.1–9.2)
NEUTROPHILS NFR BLD AUTO: 62.7 %
NITRITE UR QL STRIP.AUTO: NEGATIVE
NRBC BLD AUTO-RTO: 0 % (ref 0–1)
PH UR STRIP.AUTO: 5 [PH]
PLATELET # BLD AUTO: 180 X10(3)/MCL (ref 140–371)
PMV BLD AUTO: 12.6 FL (ref 9.4–12.4)
POTASSIUM SERPL-SCNC: 4.5 MMOL/L (ref 3.5–5.1)
PROT SERPL-MCNC: 7.5 GM/DL (ref 5.8–7.6)
PROT UR QL STRIP.AUTO: NEGATIVE MG/DL
PSA SERPL-MCNC: 1.97 NG/ML
RBC # BLD AUTO: 3.81 X10(6)/MCL (ref 4.7–6.1)
RBC #/AREA URNS AUTO: NORMAL /HPF
RBC UR QL AUTO: NEGATIVE UNIT/L
SODIUM SERPL-SCNC: 141 MMOL/L (ref 136–145)
SP GR UR STRIP.AUTO: 1.02
SQUAMOUS #/AREA URNS LPF: NORMAL /HPF
TRIGL SERPL-MCNC: 86 MG/DL (ref 34–140)
UROBILINOGEN UR STRIP-ACNC: NORMAL MG/DL
VLDLC SERPL CALC-MCNC: 17 MG/DL
WBC # SPEC AUTO: 6.3 X10(3)/MCL (ref 4.5–11.5)
WBC #/AREA URNS AUTO: NORMAL /HPF

## 2022-12-29 PROCEDURE — 84153 ASSAY OF PSA TOTAL: CPT

## 2022-12-29 PROCEDURE — 81001 URINALYSIS AUTO W/SCOPE: CPT

## 2022-12-29 PROCEDURE — 85025 COMPLETE CBC W/AUTO DIFF WBC: CPT

## 2022-12-29 PROCEDURE — 83036 HEMOGLOBIN GLYCOSYLATED A1C: CPT

## 2022-12-29 PROCEDURE — 36415 COLL VENOUS BLD VENIPUNCTURE: CPT

## 2022-12-29 PROCEDURE — 80061 LIPID PANEL: CPT

## 2022-12-29 PROCEDURE — 80053 COMPREHEN METABOLIC PANEL: CPT

## 2023-01-27 ENCOUNTER — TELEPHONE (OUTPATIENT)
Dept: INTERNAL MEDICINE | Facility: CLINIC | Age: 63
End: 2023-01-27
Payer: MEDICARE

## 2023-01-27 NOTE — TELEPHONE ENCOUNTER
Yes, we have documentation from [facility]. They are asking for updated testing which is planned at his Monday appt.     Thanks

## 2023-01-27 NOTE — TELEPHONE ENCOUNTER
Pt called and stated a dismissal of coverage letter was sent to him in the mail for his oxygen machine, pt gave me the number and fax that was on the letter and stated that he will also bring the letter with him on Monday when he comes in for his appointment.  number [phone] and fax [phone]

## 2023-01-30 ENCOUNTER — OFFICE VISIT (OUTPATIENT)
Dept: INTERNAL MEDICINE | Facility: CLINIC | Age: 63
End: 2023-01-30
Payer: MEDICARE

## 2023-01-30 VITALS
SYSTOLIC BLOOD PRESSURE: 144 MMHG | HEART RATE: 88 BPM | TEMPERATURE: 98 F | DIASTOLIC BLOOD PRESSURE: 78 MMHG | BODY MASS INDEX: 22.48 KG/M2 | WEIGHT: 169.63 LBS | HEIGHT: 73 IN | RESPIRATION RATE: 20 BRPM

## 2023-01-30 DIAGNOSIS — Z99.81 OXYGEN DEPENDENT: ICD-10-CM

## 2023-01-30 DIAGNOSIS — I10 HYPERTENSION, UNSPECIFIED TYPE: ICD-10-CM

## 2023-01-30 DIAGNOSIS — N18.2 STAGE 2 CHRONIC KIDNEY DISEASE: ICD-10-CM

## 2023-01-30 DIAGNOSIS — E78.2 MIXED HYPERLIPIDEMIA: ICD-10-CM

## 2023-01-30 DIAGNOSIS — R73.03 PREDIABETES: ICD-10-CM

## 2023-01-30 DIAGNOSIS — Z00.00 WELLNESS EXAMINATION: ICD-10-CM

## 2023-01-30 DIAGNOSIS — D52.0 DIETARY FOLATE DEFICIENCY ANEMIA: ICD-10-CM

## 2023-01-30 DIAGNOSIS — Z77.090 ASBESTOS EXPOSURE: ICD-10-CM

## 2023-01-30 DIAGNOSIS — J44.9 SEVERE CHRONIC OBSTRUCTIVE PULMONARY DISEASE: Primary | ICD-10-CM

## 2023-01-30 DIAGNOSIS — N52.9 ERECTILE DYSFUNCTION, UNSPECIFIED ERECTILE DYSFUNCTION TYPE: ICD-10-CM

## 2023-01-30 DIAGNOSIS — Z86.79 HISTORY OF ARTERIOSCLEROTIC CARDIOVASCULAR DISEASE: ICD-10-CM

## 2023-01-30 PROCEDURE — 3078F DIAST BP <80 MM HG: CPT | Mod: CPTII,,, | Performed by: NURSE PRACTITIONER

## 2023-01-30 PROCEDURE — 3078F PR MOST RECENT DIASTOLIC BLOOD PRESSURE < 80 MM HG: ICD-10-PCS | Mod: CPTII,,, | Performed by: NURSE PRACTITIONER

## 2023-01-30 PROCEDURE — 3077F SYST BP >= 140 MM HG: CPT | Mod: CPTII,,, | Performed by: NURSE PRACTITIONER

## 2023-01-30 PROCEDURE — 99214 PR OFFICE/OUTPT VISIT, EST, LEVL IV, 30-39 MIN: ICD-10-PCS | Mod: S$PBB,,, | Performed by: NURSE PRACTITIONER

## 2023-01-30 PROCEDURE — 1160F PR REVIEW ALL MEDS BY PRESCRIBER/CLIN PHARMACIST DOCUMENTED: ICD-10-PCS | Mod: CPTII,,, | Performed by: NURSE PRACTITIONER

## 2023-01-30 PROCEDURE — 99214 OFFICE O/P EST MOD 30 MIN: CPT | Mod: S$PBB,,, | Performed by: NURSE PRACTITIONER

## 2023-01-30 PROCEDURE — 99215 OFFICE O/P EST HI 40 MIN: CPT | Mod: PBBFAC | Performed by: NURSE PRACTITIONER

## 2023-01-30 PROCEDURE — 3077F PR MOST RECENT SYSTOLIC BLOOD PRESSURE >= 140 MM HG: ICD-10-PCS | Mod: CPTII,,, | Performed by: NURSE PRACTITIONER

## 2023-01-30 PROCEDURE — 1160F RVW MEDS BY RX/DR IN RCRD: CPT | Mod: CPTII,,, | Performed by: NURSE PRACTITIONER

## 2023-01-30 PROCEDURE — 3008F BODY MASS INDEX DOCD: CPT | Mod: CPTII,,, | Performed by: NURSE PRACTITIONER

## 2023-01-30 PROCEDURE — 3008F PR BODY MASS INDEX (BMI) DOCUMENTED: ICD-10-PCS | Mod: CPTII,,, | Performed by: NURSE PRACTITIONER

## 2023-01-30 PROCEDURE — 1159F PR MEDICATION LIST DOCUMENTED IN MEDICAL RECORD: ICD-10-PCS | Mod: CPTII,,, | Performed by: NURSE PRACTITIONER

## 2023-01-30 PROCEDURE — 1159F MED LIST DOCD IN RCRD: CPT | Mod: CPTII,,, | Performed by: NURSE PRACTITIONER

## 2023-01-30 RX ORDER — SILDENAFIL 50 MG/1
50 TABLET, FILM COATED ORAL DAILY PRN
Qty: 10 TABLET | Refills: 1 | Status: SHIPPED | OUTPATIENT
Start: 2023-01-30 | End: 2023-02-08 | Stop reason: HOSPADM

## 2023-01-30 RX ORDER — BENAZEPRIL HYDROCHLORIDE 40 MG/1
40 TABLET ORAL DAILY
Qty: 90 TABLET | Refills: 1 | Status: SHIPPED | OUTPATIENT
Start: 2023-01-30 | End: 2023-07-31 | Stop reason: SDUPTHER

## 2023-01-30 RX ORDER — TIOTROPIUM BROMIDE AND OLODATEROL 3.124; 2.736 UG/1; UG/1
2 SPRAY, METERED RESPIRATORY (INHALATION) DAILY
Qty: 4 G | Refills: 6 | Status: SHIPPED | OUTPATIENT
Start: 2023-01-30 | End: 2023-07-31 | Stop reason: SDUPTHER

## 2023-01-30 RX ORDER — AMLODIPINE BESYLATE 10 MG/1
10 TABLET ORAL DAILY
Qty: 90 TABLET | Refills: 1 | Status: SHIPPED | OUTPATIENT
Start: 2023-01-30 | End: 2023-07-31 | Stop reason: SDUPTHER

## 2023-01-30 RX ORDER — METOPROLOL SUCCINATE 25 MG/1
25 TABLET, EXTENDED RELEASE ORAL DAILY
Qty: 90 TABLET | Refills: 1 | Status: SHIPPED | OUTPATIENT
Start: 2023-01-30 | End: 2023-07-31 | Stop reason: SDUPTHER

## 2023-01-30 RX ORDER — ISOSORBIDE MONONITRATE 30 MG/1
30 TABLET, EXTENDED RELEASE ORAL DAILY
Qty: 90 TABLET | Refills: 1 | Status: SHIPPED | OUTPATIENT
Start: 2023-01-30 | End: 2023-07-31 | Stop reason: SDUPTHER

## 2023-01-30 RX ORDER — ATORVASTATIN CALCIUM 10 MG/1
10 TABLET, FILM COATED ORAL NIGHTLY
Qty: 90 TABLET | Refills: 1 | Status: SHIPPED | OUTPATIENT
Start: 2023-01-30 | End: 2023-07-31 | Stop reason: SDUPTHER

## 2023-01-30 RX ORDER — IPRATROPIUM BROMIDE AND ALBUTEROL SULFATE 2.5; .5 MG/3ML; MG/3ML
3 SOLUTION RESPIRATORY (INHALATION)
Qty: 75 ML | Refills: 6 | Status: SHIPPED | OUTPATIENT
Start: 2023-01-30 | End: 2023-07-31 | Stop reason: SDUPTHER

## 2023-01-30 RX ORDER — ALBUTEROL SULFATE 90 UG/1
2 AEROSOL, METERED RESPIRATORY (INHALATION) EVERY 6 HOURS PRN
Qty: 18 G | Refills: 3 | Status: SHIPPED | OUTPATIENT
Start: 2023-01-30 | End: 2023-07-31 | Stop reason: SDUPTHER

## 2023-01-30 RX ORDER — ASPIRIN 81 MG/1
81 TABLET ORAL DAILY
Qty: 90 TABLET | Refills: 1 | Status: SHIPPED | OUTPATIENT
Start: 2023-01-30 | End: 2023-07-31 | Stop reason: SDUPTHER

## 2023-01-30 RX ORDER — FOLIC ACID 1 MG/1
1 TABLET ORAL DAILY
Qty: 30 TABLET | Refills: 11 | Status: SHIPPED | OUTPATIENT
Start: 2023-01-30 | End: 2023-07-31 | Stop reason: SDUPTHER

## 2023-01-30 NOTE — ASSESSMENT & PLAN NOTE
Renal function stable  Avoid NSAIDs (i.e., Advil, Aleve, Ibuprofen, Motrin, Naproxen, Diclofenac, Indocin, Celebrex, Mobic, Voltaren). Avoid the following GI meds: Milk of Mag, Mylanta, Fleets Enema, and Pepto-Bismol. Okay to take Tylenol (acetaminophen) (if no end-stage liver disease), low dose ASA (81 mg), Miralax, Tums, and Colace. Increase clear fluid intake. Avoid dark sodas.

## 2023-01-30 NOTE — PROGRESS NOTES
ZACARIAS Cook   [facility]  [address] [address]      PATIENT NAME: Len Goss  : 1960  DATE: 23  MRN: [id number]      Billing Provider: ZACARIAS Cook  Level of Service:   Patient PCP Information       Provider PCP Type    ZACARIAS Cook General            Reason for Visit / Chief Complaint: Follow-up (Lab review/O2 re-evaluation) and Hyperbaric Oxygen Therapy       History of Present Illness / Problem Focused Workflow     Len Goss presents to the clinic with Follow-up (Lab review/O2 re-evaluation) and Hyperbaric Oxygen Therapy     (19): Pt presenting to clinic for f/u HTN, HLD, and COPD. Bp at goal. Bp managed with Amlodipine 5 mg po daily and Benazepril 40 mg total po daily. LDL 18. Currently taking Atorvastatin 10 mg po daily. COPD managed with Stiolto and Duonebs as needed (i.s., SOB, wheeze, bad cough). PFTs 1/3/19 revealed: severe obstruction, no BD response, increased RV, and moderate reduction in diffusion. Pt does admit SOB with moderate exertion. He states that he has no issues at rest. Hx of tobacco use. Denies current use. Carotid US-Calcified plaque in the carotid bifurcation. LE Arterial US negative. HgA1c 5.7%. Denies fever, chills, CP, cough, wheeze, or SOB at present. No other problems stated.    4/15/19: Pt presenting for f/u. Recently seen in Cardio Clinic (19) for carotid stenosis, atypical CP. Further testing pending. Pt questioning when Echo will be scheduled. Apparently no order was entered per Cardio. He is planning on going to Cardio today to ask about Echo. Bp at goal. Bp managed with Amlodipine 5 mg po daily and Benazepril 40 mg total po daily. LDL 18. Currently taking Atorvastatin 10 mg po daily. COPD managed with Stiolto and Duonebs as needed (i.s., SOB, wheeze, bad cough). Pulm appt scheduled 19. Renal indices stable. Denies fever, chills, CP, cough,  "wheeze, or SOB at present. Denies abd pain or dysuria. No other problems stated.    (10/16/19): Pt presenting for 6-mth f/u. Accompanied by spouse, Kennedi. PmHx of HTN, HLD, Carotid Stenosis, Hx of abnl stress test, severe COPD. Following Cardio. Last OV 6/25/19. Following Pulm clinic for severe COPD. No recent exacerbations. Last visit in Pulm 4/30/19. Low dose CT Chest 05/22/19 negative for lung ca. He does endorse dyspnea with exertion but reports full recovery after a rest break. Pt was seen in Cardio Clinic 10/8/19 for 3-mth f/u angiogram (7/12/19). States that "no blockages" found but that "my heart wasn't pumping right." States Cardiologist told  him to continue his current medications for now. He also added a BB and nitrate. He continues with bilateral leg pain. Cardio has stopped Lipitor for now to determine if this is contributing to leg pain. He will also have a LE Art US on 1/6/2020.   Lab review:   Renal indices stable   HgA1c 5.9%   LDL 35   Mild anemia, otherwise, CBC unremarkable  Denies fever, chills, CP, cough, wheeze, or SOB at present, abd pain, dysuria, or LE edema. No other problems stated.    (1/16/2020): Pt presenting for 3-mth f/u. Accompanied by spouse, Kennedi. PmHx of HTN, HLD, CKD 2, Carotid Stenosis, Hx of abnl stress test/CP, severe COPD. Following Cardio. Last OV 10/15/19. F/u 7/7/2020. Previously following Pul clinic for severe COPD. No recent exacerbations. Last visit in Pulm 10/29/19. He was discharged from Pulm clinic. Low dose CT Chest 05/22/19 negative for lung ca. LDL 84. Renal indices stable. Mild anemia stable. Hgb 13.1; HCT 41.0. FIT (1/4/2020) negative. PSA 1.7. Pt reports that he's feeling well. No acute concerns today.    (7/7/2020): Mr. Harrington is a 60 yo  male with a PmHx of HTN, HLD, CKD 2, Carotid Stenosis, Hx of abnl stress test/CP, and severe COPD, presenting for 3-mth f/u. Accompanied by spouse, Kennedi Goss. Following Cardio. Scheduled to f/u today, 7/7/2020. " Previously following Pul clinic for severe COPD. No recent exacerbations. Last visit in Pul 10/29/19 and he was discharged from Pul clinic. He continues with his nebs PRN and Stiolto. He does have dyspnea with extreme exertion. Not at rest. Not worse from baseline. Denies cough or wheeze. Low dose CT Chest 05/22/19 negative for lung ca. He is due for repeat annual screening. LDL 84. Taken off of statin in the past due to leg pain but states pain about the same. Renal indices stable. Mild anemia stable. FIT (1/4/2020) negative. PSA 1.7. Pt reports that he's feeling well. No acute concerns today.    (1/7/2021): Mr. Harrington is a 59 yo  male with a PmHx of HTN, HLD, CKD 2, Carotid Stenosis, Hx of abnl stress test/CP (resolved), and severe stage 3 COPD, presenting for routine f/u. He is following Cards for carotid stenosis/CP. Last visit 7/2020. To f/u next month month. Denies chest pain, weakness, dizziness, syncope.   Lab review:  Renal indices stable   HgA1c 5.2%   PSA 2.04   Total cholesterol 192, HDL 90, LDL 80   TSH   Mild dec in total RBC, H/H   FIT negative 1/4/2020, due now. He denies any changes in stool consistency or bowel patterns. Has not had a colonoscopy and not interested.   LDCT for lung cancer screening 8/2020: benign.    Cardiac tests:    Echo (7/29/2019) EF 50 to 55%.   Coronary angiogram (7/12/2019) normal coronaries   Ultrasound the lower extremities (1.6.20)    The Doppler waveforms are multiphasic at the ankle bilaterally    The resting ABIs are normal    The post-stress ABIs are normal  No evidence of significant arterial insufficiency was identified on the study     He reports previously taken off of statin by Cards d/t past c/o leg pain. States although he's been off for ~1 year, he remains with bilateral aching upper leg pain that is worse with prolonged ambulation or climbing stairs/ladders. States pain has been ongoing for years. Had a work-related injury in the 90s that resulted  "in him being "slung 70 feet" in the air and landing in some trees. He admits following a chiropractor at that time and was told he had a fracture in right hip. Never had surgery. Pain relieved with rest. No imaging on file.       He has no acute concerns today.    (7/7/2021): Mr. Harrington is a 61 yo  male with a PmHx of HTN, HLD, CKD 2, Carotid Stenosis, Hx of abnl stress test/CP (resolved), and severe stage 3 COPD, presenting for routine f/u. He is following Cards for carotid stenosis/CP. F/u 8/17/21.   Lab review:   BUN 26.2/Creatinine 1.23   eGFR 64 mL/min   Ferritin 301.60   Folate level 5.5, low   Vitamin B12 1016  RBC count 3.89   H/H 12.4/38.2   +FIT 1/2021. Referred to GI lab.   Pt underwent XR right femur 1/2021 due to c/o leg pain. XR concerning for bone lesion. F/u imaging revealed 2/19/21:  "A 3.3 cm x 2.5 cm x 2.1 cm intraosseous lesion is present to the   intratrochanteric region of the right femur. 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 He was referred to Dr. Pradeep Chaudhry in [facility] for further eval. No appt received.      (8/17/2021): Mr. Harrington is a 61 yo  male with a PmHx of HTN, HLD, CKD 2, Carotid Stenosis, Hx of abnl stress test/CP (resolved), and severe stage 3 COPD, presenting for f/u for 6-min walk test due to previous reports of dyspnea on exertion. States most SOB when ambulating for long distances > 100 ft. Tries to walk q evening with wife in the yard. However, he states, "I can only do two laps to her 10."  He normally requires a neb tx after he walks in the evening. He underwent LDCT yesterday that was benign. He has a cardiology appt this am. No other concerns.    (1/7/2022): Mr. Harrington is a 60 yo  male with a PmHx of HTN, HLD, CKD 2, Carotid Stenosis, Hx of abnl stress test/CP (resolved), and severe stage 3 COPD, presenting for f/u. Since last visit, pt now has O2 for PRN use dyspnea 2/2 severe COPD. He " currently has a portable cylinder. \[facility] [place] [facility]\"" O2 has helped his dyspnea and fatigue tremendously. He even reports that leg pain is diminished since starting O2 therapy. He was previously referred to Dr. 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 Patient admits that he was contacted for an appt.  was told he would need to be vaccinated against COVID in order to be seen at the clinic. He is declining the vaccination and prefers not to pursue further evaluation of the lesion at this time. He will notify the clinic in the future if he changes his mind. He continues to follow cards. Scheduled for colonoscopy 1/20/22 2/2 h/o +FIT. No recent labs. States has lab work due to Cards in the next few weeks; plans to complete labs then. Only requesting medication refills. SBP slightly decreased. Asymptomatic. Declines vaccines. No acute concerns.    (7/8/2022): Mr. Harrington is a 60 yo  male with a PmHx of HTN, HLD, CKD 2, Carotid Stenosis, Hx of abnl stress test/CP (resolved), and severe stage 3 COPD, presenting for 6-month f/u. Patient is s/p colonoscopy 5/12/22 that revealed:  Diverticulosis in the sigmoid colon, in the                          descending colon and in the ascending colon.                          - No specimens collected.                          - The entire examined colon is normal. [All                          Maneuvers].   Recommendation: Repeat colonoscopy in 10 years for screening                          purposes.   Labs reviewed and stable with creatinine slightly increased. Patient has been working outside in the heat and not drinking a lot of water per report. LDCT due next month. Requesting medication refills. He has no other concerns.     Today's Visit (1/30/2023): Mr. Harrington is a 60 yo  male with a PmHx of HTN, HLD, CKD 2, Carotid Stenosis, Hx of abnl stress test/CP (resolved), and severe stage 3 COPD, presenting for 6-month  "f/u. He completed labs at the end of December that returned stable or WNL. He continues to prescription medications. Previously started on oxygen therapy for CRUZ/severe COPD. States insurance requesting updated oxygen testing in order for patient to continue w/ therapy. He does not want to lose the oxygen noting it is very effective in managing his dyspnea. He did have a negative LDCT in August. He relates that he was contacted by a "group" regarding potential asbestos exposure. States a  sent him for testing which revealed +asbestos. Certain recs were made, and he was told to give info to his PCP. He is requesting medication refills. It appears he was lost to f/u in Cards. No visit in 1 year. Denies CP. No other concerns.       Review of Systems     Review of Systems   Constitutional: Negative.    HENT: Negative.     Eyes: Negative.    Respiratory:  Positive for shortness of breath.    Cardiovascular: Negative.    Gastrointestinal: Negative.    Endocrine: Negative.    Genitourinary: Negative.    Musculoskeletal: Negative.    Skin: Negative.    Allergic/Immunologic: Negative.    Neurological: Negative.    Hematological: Negative.    Psychiatric/Behavioral: Negative.       Medical / Social / Family History     Past Medical History:   Diagnosis Date    Anemia, unspecified     Cardiovascular disease     COPD (chronic obstructive pulmonary disease)     Hypertension     Mixed hyperlipidemia     Prediabetes     Stage 2 chronic kidney disease        Past Surgical History:   Procedure Laterality Date    COLONOSCOPY N/A 5/12/2022    Procedure: COLONOSCOPY;  Surgeon: Tai Barrios MD;  Location: [facility] ENDOSCOPY;  Service: Endoscopy;  Laterality: N/A;       Social History    reports that he quit smoking about 8 years ago. His smoking use included cigarettes. He has never used smokeless tobacco. He reports current alcohol use of about 2.0 - 4.0 standard drinks per week. He reports that he does not use " drugs.    Family History  's family history includes Diabetes type II in his mother; Hypertension in his father; Lung cancer in his mother.    Medications and Allergies     Medications  Medication List with Changes/Refills   Current Medications    NITROGLYCERIN (NITROSTAT) 0.4 MG SL TABLET    Place 0.4 mg under the tongue every 5 (five) minutes as needed for Chest pain.   Changed and/or Refilled Medications    Modified Medication Previous Medication    ALBUTEROL (PROVENTIL/VENTOLIN HFA) 90 MCG/ACTUATION INHALER albuterol (PROVENTIL/VENTOLIN HFA) 90 mcg/actuation inhaler       Inhale 2 puffs into the lungs every 6 (six) hours as needed for Wheezing or Shortness of Breath. Rescue    Inhale 2 puffs into the lungs every 6 (six) hours as needed for Wheezing or Shortness of Breath. Rescue    ALBUTEROL-IPRATROPIUM (DUO-NEB) 2.5 MG-0.5 MG/3 ML NEBULIZER SOLUTION albuterol-ipratropium (DUO-NEB) 2.5 mg-0.5 mg/3 mL nebulizer solution       Take 3 mLs by nebulization every 4 to 6 hours as needed for Wheezing or Shortness of Breath.    Inhale 3 mLs into the lungs every 4 to 6 hours as needed for Wheezing or Shortness of Breath.    AMLODIPINE (NORVASC) 10 MG TABLET amLODIPine (NORVASC) 10 MG tablet       Take 1 tablet (10 mg total) by mouth Daily.    Take 10 mg by mouth Daily.    ASPIRIN (ECOTRIN) 81 MG EC TABLET aspirin (ECOTRIN) 81 MG EC tablet       Take 1 tablet (81 mg total) by mouth once daily.    Take 81 mg by mouth Daily.    ATORVASTATIN (LIPITOR) 10 MG TABLET atorvastatin (LIPITOR) 10 MG tablet       Take 1 tablet (10 mg total) by mouth every evening.    Take 10 mg by mouth every evening.    BENAZEPRIL (LOTENSIN) 40 MG TABLET benazepriL (LOTENSIN) 40 MG tablet       Take 1 tablet (40 mg total) by mouth once daily.    Take 40 mg by mouth once daily.    FOLIC ACID (FOLVITE) 1 MG TABLET folic acid (FOLVITE) 1 MG tablet       Take 1 tablet (1 mg total) by mouth Daily.    Take 1 tablet (1 mg total) by mouth Daily.     ISOSORBIDE MONONITRATE (IMDUR) 30 MG 24 HR TABLET isosorbide mononitrate (IMDUR) 30 MG 24 hr tablet       Take 1 tablet (30 mg total) by mouth Daily.    Take 30 mg by mouth Daily.    METOPROLOL SUCCINATE (TOPROL-XL) 25 MG 24 HR TABLET metoprolol succinate (TOPROL-XL) 25 MG 24 hr tablet       Take 1 tablet (25 mg total) by mouth Daily.    Take 25 mg by mouth Daily.    SILDENAFIL (VIAGRA) 50 MG TABLET sildenafiL (VIAGRA) 50 MG tablet       Take 1 tablet (50 mg total) by mouth daily as needed for Erectile Dysfunction.    Take 1 tablet (50 mg total) by mouth daily as needed for Erectile Dysfunction.    TIOTROPIUM-OLODATEROL (STIOLTO RESPIMAT) 2.5-2.5 MCG/ACTUATION MIST tiotropium-olodateroL (STIOLTO RESPIMAT) 2.5-2.5 mcg/actuation Mist       Inhale 2 puffs into the lungs once daily.    Inhale 2 puffs into the lungs once daily.       Allergies  Review of patient's allergies indicates:   Allergen Reactions    Penicillins Swelling    Penicillin      Other reaction(s): Swelling       Physical Examination     Vitals:    01/30/23 1343   BP: (!) 144/78   Pulse:    Resp:    Temp:      Physical Exam  Constitutional:       Appearance: Normal appearance.   HENT:      Head: Normocephalic and atraumatic.      Nose: Nose normal.      Mouth/Throat:      Mouth: Mucous membranes are moist.   Eyes:      Extraocular Movements: Extraocular movements intact.      Conjunctiva/sclera: Conjunctivae normal.      Pupils: Pupils are equal, round, and reactive to light.   Neck:      Vascular: No carotid bruit.   Cardiovascular:      Rate and Rhythm: Normal rate and regular rhythm.      Pulses: Normal pulses.      Heart sounds: Normal heart sounds.   Pulmonary:      Effort: Pulmonary effort is normal.      Breath sounds: Examination of the right-lower field reveals decreased breath sounds. Examination of the left-lower field reveals decreased breath sounds. Decreased breath sounds present.   Abdominal:      General: Bowel sounds are normal.       Palpations: Abdomen is soft.   Musculoskeletal:         General: Normal range of motion.      Cervical back: Normal range of motion.      Right lower leg: No edema.      Left lower leg: No edema.   Skin:     General: Skin is warm and dry.   Neurological:      General: No focal deficit present.      Mental Status: He is alert and oriented to person, place, and time.   Psychiatric:         Mood and Affect: Mood normal.         Behavior: Behavior normal.         Thought Content: Thought content normal.         Judgment: Judgment normal.         Results     Lab Results   Component Value Date    WBC 6.3 12/29/2022    RBC 3.81 (L) 12/29/2022    HGB 12.6 (L) 12/29/2022    HCT 37.5 (L) 12/29/2022    MCV 98.4 (H) 12/29/2022    MCH 33.1 12/29/2022    MCHC 33.6 12/29/2022    RDW 12.3 12/29/2022     12/29/2022    MPV 12.6 (H) 12/29/2022     CMP  Sodium Level   Date Value Ref Range Status   12/29/2022 141 136 - 145 mmol/L Final     Potassium Level   Date Value Ref Range Status   12/29/2022 4.5 3.5 - 5.1 mmol/L Final     Carbon Dioxide   Date Value Ref Range Status   12/29/2022 27 23 - 31 mmol/L Final     Blood Urea Nitrogen   Date Value Ref Range Status   12/29/2022 14.8 8.4 - 25.7 mg/dL Final     Creatinine   Date Value Ref Range Status   12/29/2022 1.20 (H) 0.73 - 1.18 mg/dL Final     Calcium Level Total   Date Value Ref Range Status   12/29/2022 9.6 8.8 - 10.0 mg/dL Final     Albumin Level   Date Value Ref Range Status   12/29/2022 4.1 3.4 - 4.8 g/dL Final     Bilirubin Total   Date Value Ref Range Status   12/29/2022 0.7 <=1.5 mg/dL Final     Alkaline Phosphatase   Date Value Ref Range Status   12/29/2022 74 40 - 150 unit/L Final     Aspartate Aminotransferase   Date Value Ref Range Status   12/29/2022 24 5 - 34 unit/L Final     Alanine Aminotransferase   Date Value Ref Range Status   12/29/2022 17 0 - 55 unit/L Final     Estimated GFR-Non    Date Value Ref Range Status   07/01/2022 >60 mls/min/1.73/m2  "Final     Lab Results   Component Value Date    CHOL 177 12/29/2022     Lab Results   Component Value Date    HDL 92 (H) 12/29/2022     No results found for: LDLCALC  Lab Results   Component Value Date    TRIG 86 12/29/2022     No results found for: CHOLHDL  Lab Results   Component Value Date    TSH 1.8458 01/19/2022     Lab Results   Component Value Date    PHUR 7.5 01/19/2022    PROTEINUA Negative 12/29/2022    GLUCUA Normal 01/19/2022    KETONESU Negative 01/19/2022    OCCULTUA Negative 01/19/2022    NITRITE Negative 01/19/2022    LEUKOCYTESUR Negative 12/29/2022           Assessment and Plan (including Health Maintenance)     Plan:         Health Maintenance Due   Topic Date Due    COVID-19 Vaccine (1) Never done    Pneumococcal Vaccines (Age 0-64) (1 - PCV) Never done    TETANUS VACCINE  Never done    Shingles Vaccine (1 of 2) Never done    Influenza Vaccine (1) Never done       Problem List Items Addressed This Visit          Pulmonary    Severe chronic obstructive pulmonary disease - Primary    Overview     PFTs 1/2019 FEV1 42 with FEV1/FEV ratio 44  8/2021 annual lung cancer screening- benign  Continue smoking cessation (quit 5 years ago)           Current Assessment & Plan     Continue DuoNebs/ProAir PRN, Stiolto as scheduled. Continues to report CRUZ such as ambulation > 100 ft, mowing lawn, etc but symptoms managed well with O2  Continue O2 therapy         Relevant Medications    albuterol (PROVENTIL/VENTOLIN HFA) 90 mcg/actuation inhaler    albuterol-ipratropium (DUO-NEB) 2.5 mg-0.5 mg/3 mL nebulizer solution    tiotropium-olodateroL (STIOLTO RESPIMAT) 2.5-2.5 mcg/actuation Mist    Oxygen dependent    Current Assessment & Plan     6MW Test  Performing nurse/tech/RT: Salma Burnette LPN  Height: 6' 1" (185.4 cm)  Weight: 76.9 kg (169 lb 9.6 oz)  BMI (Calculated): 22.4  Predicted Distance Meters (Calculated): 642.82 meters  6MWT Status:  (patient de-satted to 92 after 2.15 sec and had to stop to rest. Resting " oxygen saturation reached 88% on RA. Oxygen applied at 2LPM via portable concentrator and test resumed around 3 minutes. Oxygen saturation chip to 96%. After 3.30s, patient stopped)  Was O2 used?: Yes  Did patient stop?: Yes  How many times?: 2  Type of assistive device(s) used?: no assistive devices  Pre-Exercise  Oxygen Saturation: 96 %  Supplemental Oxygen: 2 L/M  Post Exercise  Oxygen Saturation: 88 %  Recovery  Recovery Time (seconds): 60 seconds  Oxygen Saturation: 96 %  Supplemental Oxygen: 2 L/M  Interpretation  Predicted Distance Meters (Calculated): 642.82 meters              Cardiac/Vascular    History of arteriosclerotic cardiovascular disease    Overview     Lexiscan 2019 with small fixed inferior perfusion defect  Left heart cath 7/2019 was unremarkable  TTE 8 in 2019 unremarkable with normal EF of 50 to 55%           Relevant Medications    aspirin (ECOTRIN) 81 MG EC tablet    atorvastatin (LIPITOR) 10 MG tablet    isosorbide mononitrate (IMDUR) 30 MG 24 hr tablet    Other Relevant Orders    Ambulatory referral/consult to Cardiology    Hypertension    Relevant Medications    amLODIPine (NORVASC) 10 MG tablet    aspirin (ECOTRIN) 81 MG EC tablet    benazepriL (LOTENSIN) 40 MG tablet    metoprolol succinate (TOPROL-XL) 25 MG 24 hr tablet    Other Relevant Orders    Comprehensive Metabolic Panel    CBC Auto Differential    Mixed hyperlipidemia    Current Assessment & Plan      Latest Reference Range & Units 12/29/22 06:30   Cholesterol <=200 mg/dL 177   HDL 35 - 60 mg/dL 92 (H)   LDL Cholesterol External 50.00 - 140.00 mg/dL 68.00   Total Cholesterol/HDL Ratio 0 - 5  2   Triglycerides 34 - 140 mg/dL 86   Very Low Density Lipoprotein  17   (H): Data is abnormally high  Continue Lipitor 10 mg po daily  Educated on a low-fat, low-cholesterol diet and aerobic exercise (20-30 mins/day x 5 days a week). Encouraged healthy fruits and veggie intake. Increase water intake. Avoid excess ETOH intake and smoking            Relevant Medications    atorvastatin (LIPITOR) 10 MG tablet    Other Relevant Orders    Comprehensive Metabolic Panel    CBC Auto Differential       Renal/    Stage 2 chronic kidney disease    Current Assessment & Plan     Renal function stable  Avoid NSAIDs (i.e., Advil, Aleve, Ibuprofen, Motrin, Naproxen, Diclofenac, Indocin, Celebrex, Mobic, Voltaren). Avoid the following GI meds: Milk of Mag, Mylanta, Fleets Enema, and Pepto-Bismol. Okay to take Tylenol (acetaminophen) (if no end-stage liver disease), low dose ASA (81 mg), Miralax, Tums, and Colace. Increase clear fluid intake. Avoid dark sodas.           Erectile dysfunction    Relevant Medications    sildenafiL (VIAGRA) 50 MG tablet       Oncology    Anemia    Current Assessment & Plan     Continue folic acid   CBC stable         Relevant Medications    folic acid (FOLVITE) 1 MG tablet       Endocrine    Prediabetes    Current Assessment & Plan      Latest Reference Range & Units 22 06:30   Hemoglobin A1C External <=7.0 % 5.1   Estimated Avg Glucose mg/dL 99.7             Other    Asbestos exposure    Current Assessment & Plan     Continue routine PFTs, LDCTs annually, and COPD mgmt         Wellness examination    Overview     Colon Cancer Screening: s/p colonoscopy 22 that revealed: Diverticulosis in the sigmoid colon, in the                          descending colon and in the ascending colon.                          - No specimens collected.                          - The entire examined colon is normal. [All                          Maneuvers  Recommendation: Repeat colonoscopy in 10 years for screening purposes.   Prostate Cancer Screening:    Latest Reference Range & Units 22 06:30   PSA, Screen <=4.00 ng/mL 1.97   Lung Cancer Screenin2022 Lung-RADS Category:  2 - Benign Appearance or Behavior - continue annual screening with LDCT in 12 months.              Health Maintenance Topics with due status: Not Due       Topic  Last Completion Date    Colorectal Cancer Screening 05/12/2022    PROSTATE-SPECIFIC ANTIGEN 12/29/2022    Hemoglobin A1c (Prediabetes) 12/29/2022    Lipid Panel 12/29/2022       Future Appointments   Date Time Provider Department Center   7/28/2023  9:15 AM ZACARIAS Cook [facility]              Signature:  ZACARIAS Cook  [facility]  [address] [id number]    Date of encounter: 1/30/23

## 2023-01-30 NOTE — ASSESSMENT & PLAN NOTE
Latest Reference Range & Units 12/29/22 06:30   Cholesterol <=200 mg/dL 177   HDL 35 - 60 mg/dL 92 (H)   LDL Cholesterol External 50.00 - 140.00 mg/dL 68.00   Total Cholesterol/HDL Ratio 0 - 5  2   Triglycerides 34 - 140 mg/dL 86   Very Low Density Lipoprotein  17   (H): Data is abnormally high  Continue Lipitor 10 mg po daily  Educated on a low-fat, low-cholesterol diet and aerobic exercise (20-30 mins/day x 5 days a week). Encouraged healthy fruits and veggie intake. Increase water intake. Avoid excess ETOH intake and smoking

## 2023-01-30 NOTE — ASSESSMENT & PLAN NOTE
Latest Reference Range & Units 12/29/22 06:30   Hemoglobin A1C External <=7.0 % 5.1   Estimated Avg Glucose mg/dL 99.7

## 2023-01-30 NOTE — ASSESSMENT & PLAN NOTE
"6MW Test  Performing nurse/tech/RT: Salma Burnette LPN  Height: 6' 1" (185.4 cm)  Weight: 76.9 kg (169 lb 9.6 oz)  BMI (Calculated): 22.4  Predicted Distance Meters (Calculated): 642.82 meters  6MWT Status:  (patient de-satted to 92 after 2.15 sec and had to stop to rest. Resting oxygen saturation reached 88% on RA. Oxygen applied at 2LPM via portable concentrator and test resumed around 3 minutes. Oxygen saturation chip to 96%. After 3.30s, patient stopped)  Was O2 used?: Yes  Did patient stop?: Yes  How many times?: 2  Type of assistive device(s) used?: no assistive devices  Pre-Exercise  Oxygen Saturation: 96 %  Supplemental Oxygen: 2 L/M  Post Exercise  Oxygen Saturation: 88 %  Recovery  Recovery Time (seconds): 60 seconds  Oxygen Saturation: 96 %  Supplemental Oxygen: 2 L/M  Interpretation  Predicted Distance Meters (Calculated): 642.82 meters    "

## 2023-01-31 ENCOUNTER — TELEPHONE (OUTPATIENT)
Dept: INTERNAL MEDICINE | Facility: CLINIC | Age: 63
End: 2023-01-31
Payer: MEDICARE

## 2023-01-31 DIAGNOSIS — J44.9 SEVERE CHRONIC OBSTRUCTIVE PULMONARY DISEASE: Primary | ICD-10-CM

## 2023-01-31 NOTE — TELEPHONE ENCOUNTER
Pt called and ask for a oxygen machine, pt called Janet and stated that his insurance will cover it and that he just needs a script. Pt also stated that if NP has any questions he can be reached at [phone]

## 2023-01-31 NOTE — TELEPHONE ENCOUNTER
"Subjective     HPI  Dr. Ho Mccann 

## 2023-02-06 ENCOUNTER — TELEPHONE (OUTPATIENT)
Dept: INTERNAL MEDICINE | Facility: CLINIC | Age: 63
End: 2023-02-06
Payer: MEDICARE

## 2023-02-06 NOTE — TELEPHONE ENCOUNTER
Patient had dropped script viagra 50mg off to [facility] [place] pharmacy to get filled he let them know there to request Cialis 20mg as well because he wants to see which dosage will help him better. They faxed a request to send cialis 20mg #10 prn. Please advise.

## 2023-02-07 NOTE — TELEPHONE ENCOUNTER
Please clarify this request. He should not be taking Viagra and Cialis. Is he requesting Cialis to determine effectiveness over Viagra? If so, Rx can be sent at low dose, but it is not to be used with Viagra.

## 2023-02-08 RX ORDER — TADALAFIL 10 MG/1
10 TABLET ORAL DAILY PRN
Qty: 30 TABLET | Refills: 1 | Status: SHIPPED | OUTPATIENT
Start: 2023-02-08 | End: 2023-07-31

## 2023-03-14 ENCOUNTER — OFFICE VISIT (OUTPATIENT)
Dept: CARDIOLOGY | Facility: CLINIC | Age: 63
End: 2023-03-14
Payer: MEDICARE

## 2023-03-14 VITALS
WEIGHT: 167.56 LBS | HEART RATE: 74 BPM | SYSTOLIC BLOOD PRESSURE: 146 MMHG | BODY MASS INDEX: 22.21 KG/M2 | DIASTOLIC BLOOD PRESSURE: 79 MMHG | TEMPERATURE: 98 F | RESPIRATION RATE: 20 BRPM | HEIGHT: 73 IN | OXYGEN SATURATION: 98 %

## 2023-03-14 DIAGNOSIS — I10 HYPERTENSION, UNSPECIFIED TYPE: Primary | ICD-10-CM

## 2023-03-14 DIAGNOSIS — E78.2 MIXED HYPERLIPIDEMIA: ICD-10-CM

## 2023-03-14 PROBLEM — Z86.79 HISTORY OF ARTERIOSCLEROTIC CARDIOVASCULAR DISEASE: Status: RESOLVED | Noted: 2022-07-07 | Resolved: 2023-03-14

## 2023-03-14 PROCEDURE — 99214 OFFICE O/P EST MOD 30 MIN: CPT | Mod: PBBFAC | Performed by: INTERNAL MEDICINE

## 2023-03-14 NOTE — PROGRESS NOTES
[facility]  Cariology Clinic - Follow Up     Cardiology Attending: Dr. Garcia  Date of Visit: 3/14/2023  Reason for Visit/Chief Complaint:   Chief Complaint    re-establish care, denies chestpains, o2 dependent; Shortness of Breath; Palpitations          Subjective:      Len Goss is a 62 y.o. male with a PMH significant for hypertension, hyperlipidemia, carotid stenosis and severe obstructive COPD who presents to cardiology clinic for follow up. Last seen 1 year ago.  Leg pain has improved since last visit and he has been started on home O2.  Follows with pulmonary.  ABIs normal.  No current complaints at this time.  BP at home usually 120's -130's systolic.  Denies chest pain, shortness of breath, syncope, orthopnea, peripheral edema.    Medications:     Current Outpatient Medications   Medication Sig Dispense Refill    albuterol (PROVENTIL/VENTOLIN HFA) 90 mcg/actuation inhaler Inhale 2 puffs into the lungs every 6 (six) hours as needed for Wheezing or Shortness of Breath. Rescue 18 g 3    albuterol-ipratropium (DUO-NEB) 2.5 mg-0.5 mg/3 mL nebulizer solution Take 3 mLs by nebulization every 4 to 6 hours as needed for Wheezing or Shortness of Breath. 75 mL 6    amLODIPine (NORVASC) 10 MG tablet Take 1 tablet (10 mg total) by mouth Daily. 90 tablet 1    aspirin (ECOTRIN) 81 MG EC tablet Take 1 tablet (81 mg total) by mouth once daily. 90 tablet 1    atorvastatin (LIPITOR) 10 MG tablet Take 1 tablet (10 mg total) by mouth every evening. 90 tablet 1    benazepriL (LOTENSIN) 40 MG tablet Take 1 tablet (40 mg total) by mouth once daily. 90 tablet 1    folic acid (FOLVITE) 1 MG tablet Take 1 tablet (1 mg total) by mouth Daily. 30 tablet 11    isosorbide mononitrate (IMDUR) 30 MG 24 hr tablet Take 1 tablet (30 mg total) by mouth Daily. 90 tablet 1    metoprolol succinate (TOPROL-XL) 25 MG 24 hr tablet Take 1 tablet (25 mg total) by mouth Daily.  90 tablet 1    nitroGLYCERIN (NITROSTAT) 0.4 MG SL tablet Place 0.4 mg under the tongue every 5 (five) minutes as needed for Chest pain.      tadalafiL (CIALIS) 10 MG tablet Take 1 tablet (10 mg total) by mouth daily as needed for Erectile Dysfunction. DO NOT TAKE WITH NITROGLYCERIN 30 tablet 1    tiotropium-olodateroL (STIOLTO RESPIMAT) 2.5-2.5 mcg/actuation Mist Inhale 2 puffs into the lungs once daily. 4 g 6     No current facility-administered medications for this visit.       I have reviewed and updated the patient's medications, allergies, past medical history, surgical history, social history and family history as needed.    Review of Systems:     Review of Systems   Constitutional:  Negative for chills, fever and malaise/fatigue.   HENT:  Negative for hearing loss and sore throat.    Eyes:  Negative for blurred vision, pain and redness.   Respiratory:  Positive for shortness of breath. Negative for cough and wheezing.    Cardiovascular:  Negative for chest pain, palpitations, orthopnea, leg swelling and PND.   Gastrointestinal:  Negative for abdominal pain, constipation, diarrhea, nausea and vomiting.   Musculoskeletal:  Negative for back pain, joint pain and myalgias.   Neurological:  Negative for dizziness, sensory change and focal weakness.   Objective:     Wt Readings from Last 3 Encounters:   03/14/23 76 kg (167 lb 8.8 oz)   01/30/23 76.9 kg (169 lb 9.6 oz)   07/08/22 73.5 kg (162 lb)     Temp Readings from Last 3 Encounters:   03/14/23 98.3 °F (36.8 °C) (Oral)   01/30/23 97.7 °F (36.5 °C) (Oral)   07/08/22 97.6 °F (36.4 °C) (Oral)     BP Readings from Last 3 Encounters:   03/14/23 (!) 146/79   01/30/23 (!) 144/78   07/08/22 129/76     Pulse Readings from Last 3 Encounters:   03/14/23 74   01/30/23 88   07/08/22 64     Vitals:    03/14/23 1353   BP: (!) 146/79   BP Location: Left arm   Patient Position: Sitting   BP Method: X-Large (Automatic)   Pulse: 74   Resp: 20   Temp: 98.3 °F (36.8 °C)   TempSrc:  "Oral   SpO2: 98%   Weight: 76 kg (167 lb 8.8 oz)   Height: 6' 1" (1.854 m)     Body mass index is 22.11 kg/m².    Physical Exam  Constitutional:       General: He is not in acute distress.  HENT:      Head: Normocephalic and atraumatic.   Eyes:      General: No scleral icterus.  Neck:      Vascular: No carotid bruit.   Cardiovascular:      Rate and Rhythm: Normal rate and regular rhythm.      Heart sounds: No murmur heard.  Pulmonary:      Effort: Pulmonary effort is normal.      Breath sounds: No wheezing, rhonchi or rales.      Comments: On oxygen  Abdominal:      General: There is no distension.      Palpations: Abdomen is soft.      Tenderness: There is no abdominal tenderness.   Musculoskeletal:      Right lower leg: No edema.      Left lower leg: No edema.   Neurological:      General: No focal deficit present.      Mental Status: He is alert and oriented to person, place, and time.      Labs:   I have reviewed the following labs below:      Lab Results   Component Value Date    WBC 6.3 12/29/2022    HGB 12.6 (L) 12/29/2022    HCT 37.5 (L) 12/29/2022     12/29/2022    MCV 98.4 (H) 12/29/2022    RDW 12.3 12/29/2022     Lab Results   Component Value Date     12/29/2022    K 4.5 12/29/2022    CO2 27 12/29/2022    BUN 14.8 12/29/2022    CALCIUM 9.6 12/29/2022     Lab Results   Component Value Date    ALBUMIN 4.1 12/29/2022    BILITOT 0.7 12/29/2022    AST 24 12/29/2022    ALKPHOS 74 12/29/2022    ALT 17 12/29/2022     Cholesterol Total   Date Value Ref Range Status   12/29/2022 177 <=200 mg/dL Final     HDL Cholesterol   Date Value Ref Range Status   12/29/2022 92 (H) 35 - 60 mg/dL Final     LDL Cholesterol   Date Value Ref Range Status   12/29/2022 68.00 50.00 - 140.00 mg/dL Final     Triglyceride   Date Value Ref Range Status   12/29/2022 86 34 - 140 mg/dL Final     Lab Results   Component Value Date    HGBA1C 5.1 12/29/2022    HGBA1C 5.2 01/19/2022    HGBA1C 5.2 01/04/2021    CREATININE 1.20 (H) " 12/29/2022     Lab Results   Component Value Date    TSH 1.8458 01/19/2022     Lab Results   Component Value Date    IRON 95 06/29/2021    TIBC 314 06/29/2021    FERRITIN 276.32 (H) 07/01/2022    ADQNHSLY41 1,016 (H) 06/29/2021    FOLATE 5.5 (L) 06/29/2021     Lab Results   Component Value Date    INR 1.03 07/02/2019    PROTIME 13.4 07/02/2019      No results found for: TROPONINI, BNP, CKTOTAL, CKMB, CKMM, CKBB  Cardiovascular Studies:   I have reviewed the following studies below:      TTE (7/2019):   Left ventricular size is normal.  Left ventricular ejection fraction is measured at approximately 50-55 %.    Carotid US 12/2020  The right internal carotid artery demonstrated less than 50% stenosis.  The right vertebral artery is patent with antegrade flow.  The left internal carotid artery demonstrated less than 50% stenosis.  The left vertebral artery was not definitively identified.    LHC/Cors (7/2019):  LM: no stenosis  LAD: no stenosis  LCx: no stenosis  RCA: no stenosis    *see full report in [employer]*    Assessment/Plan:   62 y.o. male with the following medical problems:    #Severe COPD  #HTN  #HLD  #asymptomatic carotid stenosis      Plan:  - no cardiac complaints or issues at this time  - BP elevated and checked twice but he reports systolics of 120's at home - instructed patient to check BP at home daily for 2 weeks and notify clinic of results.  Will not adjust medications at this time and further management to be handled by PCP  - LDL 68 in Dec 2022 - cont atorvastatin 10mg    Call with home BP monitoring results in 2 weeks.  Return to clinic prn.    Evan Ramsay MD  [facility] Cardiology Fellow, PGY-4  03/14/2023 2:05 PM

## 2023-03-15 NOTE — PROGRESS NOTES
Cardiology Attending    I have discussed Len Goss including the patient's symptoms, findings, and management plan with the cardiology fellow.  Please see the Cardiology Note for details.

## 2023-03-21 ENCOUNTER — TELEPHONE (OUTPATIENT)
Dept: CARDIOLOGY | Facility: CLINIC | Age: 63
End: 2023-03-21
Payer: MEDICARE

## 2023-03-21 NOTE — TELEPHONE ENCOUNTER
----- Message from Dixie Becerra MA sent at 3/21/2023  9:14 AM CDT -----  Regarding: BP READING  PT TOOK HIS BP TODAY @ 9A.M., TWO HRS AFTER TAKING HIS BP MEDS. HIS BP /82 P:63. HE SAID HE FEELS FINE BUT HIS BP HAS BEEN RUNNING HIGH THEN LOW.  HE WANT TO KNOW CAN HE TAKE BOTH OF HIS BP MEDS AT THE SAME TIME INSTEAD OF 1 IN THE MORNING AND THEN 1 IN THE EVENING. CAN SOMEONE GIVE HIM A CALL. [phone]. THANKS    _____________________________________________  TL 03- 14:12 PM    Called and spoke to pt, he states he takes benzepril in the morning and all other meds at night. He states 1-2 hrs after taking benazepril, his BP is still elevated. Pt advised to begin taking isosorbide mononitrate in the mornings with benazepril, monitor BP for a week. If BP is still elevated, pt to begin taking amlodipine in the mornings as well. He will monitor BP and report readings in 2 weeks. Pt advised to call with any questions/concerns/symptoms. He verbalizes understanding.

## 2023-05-01 PROBLEM — Z00.00 WELLNESS EXAMINATION: Status: RESOLVED | Noted: 2023-01-30 | Resolved: 2023-05-01

## 2023-07-27 ENCOUNTER — LAB VISIT (OUTPATIENT)
Dept: LAB | Facility: HOSPITAL | Age: 63
End: 2023-07-27
Attending: NURSE PRACTITIONER
Payer: MEDICARE

## 2023-07-27 DIAGNOSIS — I10 HYPERTENSION, UNSPECIFIED TYPE: ICD-10-CM

## 2023-07-27 DIAGNOSIS — E78.2 MIXED HYPERLIPIDEMIA: ICD-10-CM

## 2023-07-27 LAB
ALBUMIN SERPL-MCNC: 4 G/DL (ref 3.4–4.8)
ALBUMIN/GLOB SERPL: 1.3 RATIO (ref 1.1–2)
ALP SERPL-CCNC: 80 UNIT/L (ref 40–150)
ALT SERPL-CCNC: 14 UNIT/L (ref 0–55)
AST SERPL-CCNC: 19 UNIT/L (ref 5–34)
BASOPHILS # BLD AUTO: 0.07 X10(3)/MCL
BASOPHILS NFR BLD AUTO: 1.2 %
BILIRUBIN DIRECT+TOT PNL SERPL-MCNC: 0.4 MG/DL
BUN SERPL-MCNC: 22.6 MG/DL (ref 8.4–25.7)
CALCIUM SERPL-MCNC: 9.5 MG/DL (ref 8.8–10)
CHLORIDE SERPL-SCNC: 108 MMOL/L (ref 98–107)
CO2 SERPL-SCNC: 24 MMOL/L (ref 23–31)
CREAT SERPL-MCNC: 1.13 MG/DL (ref 0.73–1.18)
EOSINOPHIL # BLD AUTO: 0.18 X10(3)/MCL (ref 0–0.9)
EOSINOPHIL NFR BLD AUTO: 3.1 %
ERYTHROCYTE [DISTWIDTH] IN BLOOD BY AUTOMATED COUNT: 12.9 % (ref 11.5–17)
GFR SERPLBLD CREATININE-BSD FMLA CKD-EPI: >60 MLS/MIN/1.73/M2
GLOBULIN SER-MCNC: 3.2 GM/DL (ref 2.4–3.5)
GLUCOSE SERPL-MCNC: 93 MG/DL (ref 82–115)
HCT VFR BLD AUTO: 39.6 % (ref 42–52)
HGB BLD-MCNC: 13.2 G/DL (ref 14–18)
IMM GRANULOCYTES # BLD AUTO: 0.01 X10(3)/MCL (ref 0–0.04)
IMM GRANULOCYTES NFR BLD AUTO: 0.2 %
LYMPHOCYTES # BLD AUTO: 1.87 X10(3)/MCL (ref 0.6–4.6)
LYMPHOCYTES NFR BLD AUTO: 32.2 %
MCH RBC QN AUTO: 33.1 PG (ref 27–31)
MCHC RBC AUTO-ENTMCNC: 33.3 G/DL (ref 33–36)
MCV RBC AUTO: 99.2 FL (ref 80–94)
MONOCYTES # BLD AUTO: 0.71 X10(3)/MCL (ref 0.1–1.3)
MONOCYTES NFR BLD AUTO: 12.2 %
NEUTROPHILS # BLD AUTO: 2.96 X10(3)/MCL (ref 2.1–9.2)
NEUTROPHILS NFR BLD AUTO: 51.1 %
NRBC BLD AUTO-RTO: 0 %
PLATELET # BLD AUTO: 183 X10(3)/MCL (ref 130–400)
PMV BLD AUTO: 11.8 FL (ref 7.4–10.4)
POTASSIUM SERPL-SCNC: 4.1 MMOL/L (ref 3.5–5.1)
PROT SERPL-MCNC: 7.2 GM/DL (ref 5.8–7.6)
RBC # BLD AUTO: 3.99 X10(6)/MCL (ref 4.7–6.1)
SODIUM SERPL-SCNC: 141 MMOL/L (ref 136–145)
WBC # SPEC AUTO: 5.8 X10(3)/MCL (ref 4.5–11.5)

## 2023-07-27 PROCEDURE — 36415 COLL VENOUS BLD VENIPUNCTURE: CPT

## 2023-07-27 PROCEDURE — 80053 COMPREHEN METABOLIC PANEL: CPT

## 2023-07-27 PROCEDURE — 85025 COMPLETE CBC W/AUTO DIFF WBC: CPT

## 2023-07-31 ENCOUNTER — OFFICE VISIT (OUTPATIENT)
Dept: INTERNAL MEDICINE | Facility: CLINIC | Age: 63
End: 2023-07-31
Payer: MEDICARE

## 2023-07-31 VITALS
HEIGHT: 73 IN | BODY MASS INDEX: 21.87 KG/M2 | DIASTOLIC BLOOD PRESSURE: 77 MMHG | SYSTOLIC BLOOD PRESSURE: 141 MMHG | HEART RATE: 68 BPM | WEIGHT: 165 LBS | TEMPERATURE: 98 F | RESPIRATION RATE: 20 BRPM

## 2023-07-31 DIAGNOSIS — Z00.00 WELLNESS EXAMINATION: ICD-10-CM

## 2023-07-31 DIAGNOSIS — Z87.891 EX-SMOKER: ICD-10-CM

## 2023-07-31 DIAGNOSIS — Z12.5 SCREENING FOR PROSTATE CANCER: ICD-10-CM

## 2023-07-31 DIAGNOSIS — E78.2 MIXED HYPERLIPIDEMIA: ICD-10-CM

## 2023-07-31 DIAGNOSIS — D52.0 DIETARY FOLATE DEFICIENCY ANEMIA: ICD-10-CM

## 2023-07-31 DIAGNOSIS — R73.03 PREDIABETES: ICD-10-CM

## 2023-07-31 DIAGNOSIS — Z99.81 OXYGEN DEPENDENT: ICD-10-CM

## 2023-07-31 DIAGNOSIS — Z86.79 HISTORY OF ARTERIOSCLEROTIC CARDIOVASCULAR DISEASE: ICD-10-CM

## 2023-07-31 DIAGNOSIS — J44.9 SEVERE CHRONIC OBSTRUCTIVE PULMONARY DISEASE: Primary | ICD-10-CM

## 2023-07-31 DIAGNOSIS — I10 HYPERTENSION, UNSPECIFIED TYPE: ICD-10-CM

## 2023-07-31 PROCEDURE — 99214 OFFICE O/P EST MOD 30 MIN: CPT | Mod: S$PBB,,, | Performed by: NURSE PRACTITIONER

## 2023-07-31 PROCEDURE — 3078F PR MOST RECENT DIASTOLIC BLOOD PRESSURE < 80 MM HG: ICD-10-PCS | Mod: CPTII,,, | Performed by: NURSE PRACTITIONER

## 2023-07-31 PROCEDURE — 1159F MED LIST DOCD IN RCRD: CPT | Mod: CPTII,,, | Performed by: NURSE PRACTITIONER

## 2023-07-31 PROCEDURE — 3008F BODY MASS INDEX DOCD: CPT | Mod: CPTII,,, | Performed by: NURSE PRACTITIONER

## 2023-07-31 PROCEDURE — 1160F RVW MEDS BY RX/DR IN RCRD: CPT | Mod: CPTII,,, | Performed by: NURSE PRACTITIONER

## 2023-07-31 PROCEDURE — 3078F DIAST BP <80 MM HG: CPT | Mod: CPTII,,, | Performed by: NURSE PRACTITIONER

## 2023-07-31 PROCEDURE — 4010F PR ACE/ARB THEARPY RXD/TAKEN: ICD-10-PCS | Mod: CPTII,,, | Performed by: NURSE PRACTITIONER

## 2023-07-31 PROCEDURE — 99214 PR OFFICE/OUTPT VISIT, EST, LEVL IV, 30-39 MIN: ICD-10-PCS | Mod: S$PBB,,, | Performed by: NURSE PRACTITIONER

## 2023-07-31 PROCEDURE — 3077F SYST BP >= 140 MM HG: CPT | Mod: CPTII,,, | Performed by: NURSE PRACTITIONER

## 2023-07-31 PROCEDURE — 4010F ACE/ARB THERAPY RXD/TAKEN: CPT | Mod: CPTII,,, | Performed by: NURSE PRACTITIONER

## 2023-07-31 PROCEDURE — 3077F PR MOST RECENT SYSTOLIC BLOOD PRESSURE >= 140 MM HG: ICD-10-PCS | Mod: CPTII,,, | Performed by: NURSE PRACTITIONER

## 2023-07-31 PROCEDURE — 3008F PR BODY MASS INDEX (BMI) DOCUMENTED: ICD-10-PCS | Mod: CPTII,,, | Performed by: NURSE PRACTITIONER

## 2023-07-31 PROCEDURE — 1159F PR MEDICATION LIST DOCUMENTED IN MEDICAL RECORD: ICD-10-PCS | Mod: CPTII,,, | Performed by: NURSE PRACTITIONER

## 2023-07-31 PROCEDURE — 99214 OFFICE O/P EST MOD 30 MIN: CPT | Mod: PBBFAC | Performed by: NURSE PRACTITIONER

## 2023-07-31 PROCEDURE — 1160F PR REVIEW ALL MEDS BY PRESCRIBER/CLIN PHARMACIST DOCUMENTED: ICD-10-PCS | Mod: CPTII,,, | Performed by: NURSE PRACTITIONER

## 2023-07-31 RX ORDER — METOPROLOL SUCCINATE 25 MG/1
25 TABLET, EXTENDED RELEASE ORAL DAILY
Qty: 90 TABLET | Refills: 1 | Status: SHIPPED | OUTPATIENT
Start: 2023-07-31 | End: 2024-03-04

## 2023-07-31 RX ORDER — FOLIC ACID 1 MG/1
1 TABLET ORAL DAILY
Qty: 30 TABLET | Refills: 11 | Status: SHIPPED | OUTPATIENT
Start: 2023-07-31 | End: 2024-07-25

## 2023-07-31 RX ORDER — TIOTROPIUM BROMIDE AND OLODATEROL 3.124; 2.736 UG/1; UG/1
2 SPRAY, METERED RESPIRATORY (INHALATION) DAILY
Qty: 4 G | Refills: 6 | Status: SHIPPED | OUTPATIENT
Start: 2023-07-31

## 2023-07-31 RX ORDER — BENAZEPRIL HYDROCHLORIDE 40 MG/1
40 TABLET ORAL DAILY
Qty: 90 TABLET | Refills: 1 | Status: SHIPPED | OUTPATIENT
Start: 2023-07-31 | End: 2024-01-31

## 2023-07-31 RX ORDER — ASPIRIN 81 MG/1
81 TABLET ORAL DAILY
Qty: 90 TABLET | Refills: 1 | Status: SHIPPED | OUTPATIENT
Start: 2023-07-31 | End: 2024-01-27

## 2023-07-31 RX ORDER — ATORVASTATIN CALCIUM 10 MG/1
10 TABLET, FILM COATED ORAL NIGHTLY
Qty: 90 TABLET | Refills: 1 | Status: SHIPPED | OUTPATIENT
Start: 2023-07-31 | End: 2024-01-31

## 2023-07-31 RX ORDER — ALBUTEROL SULFATE 90 UG/1
2 AEROSOL, METERED RESPIRATORY (INHALATION) EVERY 6 HOURS PRN
Qty: 18 G | Refills: 3 | Status: SHIPPED | OUTPATIENT
Start: 2023-07-31

## 2023-07-31 RX ORDER — ISOSORBIDE MONONITRATE 30 MG/1
30 TABLET, EXTENDED RELEASE ORAL DAILY
Qty: 90 TABLET | Refills: 1 | Status: SHIPPED | OUTPATIENT
Start: 2023-07-31 | End: 2024-01-31

## 2023-07-31 RX ORDER — IPRATROPIUM BROMIDE AND ALBUTEROL SULFATE 2.5; .5 MG/3ML; MG/3ML
3 SOLUTION RESPIRATORY (INHALATION)
Qty: 75 ML | Refills: 6 | Status: SHIPPED | OUTPATIENT
Start: 2023-07-31

## 2023-07-31 RX ORDER — AMLODIPINE BESYLATE 10 MG/1
10 TABLET ORAL DAILY
Qty: 90 TABLET | Refills: 1 | Status: SHIPPED | OUTPATIENT
Start: 2023-07-31 | End: 2024-01-31

## 2023-07-31 RX ORDER — SILDENAFIL 50 MG/1
50 TABLET, FILM COATED ORAL DAILY PRN
COMMUNITY
Start: 2023-05-24 | End: 2023-07-31

## 2023-07-31 NOTE — ASSESSMENT & PLAN NOTE
"[facility]-Cardiology Progress note     LOS: 1 day   Patient Care Team:  Mady Coy MD as PCP - General (Internal Medicine)  Shar Obregon MD as PCP - Claims Attributed  Shorty Guido MD as Consulting Physician (General Surgery)    Chief Complaint: Shortness of breath    Subjective:    Interval History: The patient does not feel that his dyspnea has improved significantly despite aggressive IV loop diuretic administration.  The patient reports having an episode of severe dizziness while in bed yesterday.  The patient systolic blood pressures have been ranging from  mmHg.  He has had no chest discomfort.    Patient Complaints: Labored respirations and shortness of breath.  Dizziness at rest.  Patient Denies:    peripheral edema, palpitations, cough, sputum production, hemoptysis, abdominal pain, nausea, vomiting, diarrhea, fevers chills or night sweats  History taken from: patient    Review of Systems:   All systems were reviewed and negative       Objective:    Vital Sign Min/Max for last 24 hours  Temp  Min: 97.8 °F (36.6 °C)  Max: 98.4 °F (36.9 °C)   BP  Min: 87/53  Max: 148/75   Pulse  Min: 59  Max: 86   Resp  Min: 18  Max: 18   SpO2  Min: 93 %  Max: 99 %   No data recorded   Weight  Min: 78.5 kg (173 lb)  Max: 79.8 kg (175 lb 14.8 oz)     Flowsheet Rows      First Filed Value   Admission Height  190.5 cm (75\") Documented at 07/07/2020 1555   Admission Weight  78.5 kg (173 lb) Documented at 07/07/2020 1555          Physical Exam:     General Appearance:    Alert, cooperative, in no acute distress   Head:    Normocephalic, without obvious abnormality, atraumatic   Eyes:            Lids and lashes normal, conjunctivae and sclerae normal, no   icterus, no pallor, corneas clear, PERRLA   Ears:    Ears appear intact with no abnormalities noted   Throat:   No oral lesions, no thrush, oral mucosa moist   Neck:   No adenopathy, supple, trachea midline, no thyromegaly, no   carotid bruit, no JVD   Back:  " Continue DuoNebs/ProAir PRN, Stiolto as scheduled. Continues to report CRUZ such as ambulation > 100 ft, mowing lawn, etc but symptoms managed well with O2  Continue O2 therapy      No kyphosis present, no scoliosis present, no skin lesions,      erythema or scars, no tenderness to percussion or                   palpation,   range of motion normal   Lungs:     Clear to auscultation,respirations regular, even and                  unlabored    Heart:    Regular rhythm and normal rate, normal S1 and S2, no            murmur, no gallop, no rub, no click   Chest Wall:    No abnormalities observed   Abdomen:     Normal bowel sounds, no masses, no organomegaly, soft        non-tender, non-distended, no guarding, no rebound                tenderness   Rectal:     Deferred   Extremities:   Moves all extremities well, no edema, no cyanosis, no             redness   Pulses:   Pulses palpable and equal bilaterally   Skin:   No bleeding, bruising or rash   Lymph nodes:   No palpable adenopathy   Neurologic:   Cranial nerves 2 - 12 grossly intact, sensation intact, DTR       present and equal bilaterally        Results Review:     I reviewed the patient's new clinical results.  Results from last 7 days   Lab Units 07/04/20  0658   HEMOGLOBIN A1C % 6.20*     Results from last 7 days   Lab Units 07/08/20  0546   SODIUM mmol/L 140   POTASSIUM mmol/L 4.2   CHLORIDE mmol/L 106   CO2 mmol/L 22.5   BUN mg/dL 30*   CREATININE mg/dL 1.93*   GLUCOSE mg/dL 98   CALCIUM mg/dL 8.6     Results from last 7 days   Lab Units 07/07/20  1611 07/06/20  0609 07/05/20  0428  07/04/20  0658   WBC 10*3/mm3 5.75 6.80  --   --  18.38*   HEMOGLOBIN g/dL 12.1* 11.2* 11.7*   < > 13.8   HEMATOCRIT % 37.6 35.1* 36.7*   < > 42.0   PLATELETS 10*3/mm3 173 156  --   --  184    < > = values in this interval not displayed.         Echo EF Estimated  Lab Results   Component Value Date    ECHOEFEST 50 06/01/2017     Physical Exam    Medication Review: yes    Assessment/Plan:      Shortness of breath-decompensated congestive heart failure  Heart failure with reduced ejection fraction.  Stage D.  New York Heart Association functional class IV  symptoms.  Left ventricular ejection fraction 20-25% with massive left ventricular aneurysm.  Ischemic etiology.  Coronary artery disease.  Native heart.  Native vessels.  Angina free.  Secondary mitral regurgitation due to ischemic cardiomyopathy.  The patient is status post percutaneous MitraClip procedure.  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  Normally functioning ICD.  Multiple prior ICD shocks during ventricular tachycardia storm.  Ventricular arrhythmia controlled with amiodarone therapy.  Unfortunately, amiodarone therapy resulted in significant liver toxicity.  The decision was made to decrease the dose of his amiodarone therapy rather than discontinue it despite evidence of liver inflammation.  The patient was not felt to be a candidate for Tikosyn or mexiletine as alternative antiarrhythmic drug therapy.  He has had no further defibrillator discharges or therapies delivered.  Paroxysmal atrial fibrillation.  The patient is maintaining normal sinus rhythm.  The patient has not yet been restarted on Eliquis.  He has been instructed to discontinue warfarin therapy due to previous diffuse alveolar pulmonary hemorrhage complicating the triple use of aspirin, Plavix and warfarin.  End-stage ischemic cardiomyopathy.  Patient's current life expectancy is 1-2 months.      Our goal is to maintain the patient's systolic blood pressure in the upper 70s-lower 80s millimeters mercury range.  This blood pressure will result in severe risk of falls if he is allowed to resume an upright posture.  The patient is to be confined to strict bedrest with no bathroom privileges.  He will only be allowed to sit up in a chair if he has assistance with posture change.  Given the patient's severe end-stage heart failure the only way to keep the patient from being short of breath is to lower his blood pressure as far as possible without loss of consciousness.  This will ultimately result and the  "patient being \"bedridden\".  In the periods of time when his blood pressure is therapeutic between 75-85 mmHg he should be assessed frequently for mental status changes particularly confusion or disorientation as well as delirium and/or hallucinations as well as close monitoring of urine output.  I will speak with the palliative care physician as the patient will require inpatient hospice care from this point going forward.  Under no circumstances can he be allowed to go home given the medical strategy necessary to control his dyspnea.  I will contact palliative care over the next 48-72 hours regarding the possibility of inpatient hospice versus in-home \"bed ridden\" palliative care.      Plan for disposition:Where: hospice and When:  25-30days    Vasyl Sue MD  07/08/20  11:56        "

## 2023-07-31 NOTE — PROGRESS NOTES
ZACARIAS Cook   [facility]  [address] [facility] [address]      PATIENT NAME: Len Goss  : 1960  DATE: 23  MRN: [id number]        Reason for Visit / Chief Complaint: Follow-up (Needs refills, refused vaccines)       History of Present Illness / Problem Focused Workflow     Len Goss presents to the clinic with Follow-up (Needs refills, refused vaccines)     (19): Pt presenting to clinic for f/u HTN, HLD, and COPD. Bp at goal. Bp managed with Amlodipine 5 mg po daily and Benazepril 40 mg total po daily. LDL 18. Currently taking Atorvastatin 10 mg po daily. COPD managed with Stiolto and Duonebs as needed (i.s., SOB, wheeze, bad cough). PFTs 1/3/19 revealed: severe obstruction, no BD response, increased RV, and moderate reduction in diffusion. Pt does admit SOB with moderate exertion. He states that he has no issues at rest. Hx of tobacco use. Denies current use. Carotid US-Calcified plaque in the carotid bifurcation. LE Arterial US negative. HgA1c 5.7%. Denies fever, chills, CP, cough, wheeze, or SOB at present. No other problems stated.     4/15/19: Pt presenting for f/u. Recently seen in Cardio Clinic (19) for carotid stenosis, atypical CP. Further testing pending. Pt questioning when Echo will be scheduled. Apparently no order was entered per Cardio. He is planning on going to Cardio today to ask about Echo. Bp at goal. Bp managed with Amlodipine 5 mg po daily and Benazepril 40 mg total po daily. LDL 18. Currently taking Atorvastatin 10 mg po daily. COPD managed with Stiolto and Duonebs as needed (i.s., SOB, wheeze, bad cough). Pulm appt scheduled 19. Renal indices stable. Denies fever, chills, CP, cough, wheeze, or SOB at present. Denies abd pain or dysuria. No other problems stated.     (10/16/19): Pt presenting for 6-mth f/u. Accompanied by spouse, Kennedi. PmHx of HTN, HLD, Carotid Stenosis, Hx of  "abnl stress test, severe COPD. Following Cardio. Last OV 6/25/19. Following Pulm clinic for severe COPD. No recent exacerbations. Last visit in Pulm 4/30/19. Low dose CT Chest 05/22/19 negative for lung ca. He does endorse dyspnea with exertion but reports full recovery after a rest break. Pt was seen in Cardio Clinic 10/8/19 for 3-mth f/u angiogram (7/12/19). States that "no blockages" found but that "my heart wasn't pumping right." States Cardiologist told  him to continue his current medications for now. He also added a BB and nitrate. He continues with bilateral leg pain. Cardio has stopped Lipitor for now to determine if this is contributing to leg pain. He will also have a LE Art US on 1/6/2020.   Lab review:   Renal indices stable   HgA1c 5.9%   LDL 35   Mild anemia, otherwise, CBC unremarkable  Denies fever, chills, CP, cough, wheeze, or SOB at present, abd pain, dysuria, or LE edema. No other problems stated.     (1/16/2020): Pt presenting for 3-mth f/u. Accompanied by spouse, Kennedi. PmHx of HTN, HLD, CKD 2, Carotid Stenosis, Hx of abnl stress test/CP, severe COPD. Following Cardio. Last OV 10/15/19. F/u 7/7/2020. Previously following Pul clinic for severe COPD. No recent exacerbations. Last visit in Pulm 10/29/19. He was discharged from Pulm clinic. Low dose CT Chest 05/22/19 negative for lung ca. LDL 84. Renal indices stable. Mild anemia stable. Hgb 13.1; HCT 41.0. FIT (1/4/2020) negative. PSA 1.7. Pt reports that he's feeling well. No acute concerns today.     (7/7/2020): Mr. Harrington is a 58 yo  male with a PmHx of HTN, HLD, CKD 2, Carotid Stenosis, Hx of abnl stress test/CP, and severe COPD, presenting for 3-mth f/u. Accompanied by spouse, Kennedi Goss. Following Cardio. Scheduled to f/u today, 7/7/2020. Previously following Pulm clinic for severe COPD. No recent exacerbations. Last visit in Pulm 10/29/19 and he was discharged from Pulm clinic. He continues with his nebs PRN and Stiolto. He does " "have dyspnea with extreme exertion. Not at rest. Not worse from baseline. Denies cough or wheeze. Low dose CT Chest 05/22/19 negative for lung ca. He is due for repeat annual screening. LDL 84. Taken off of statin in the past due to leg pain but states pain about the same. Renal indices stable. Mild anemia stable. FIT (1/4/2020) negative. PSA 1.7. Pt reports that he's feeling well. No acute concerns today.     (1/7/2021): Mr. Harrington is a 61 yo  male with a PmHx of HTN, HLD, CKD 2, Carotid Stenosis, Hx of abnl stress test/CP (resolved), and severe stage 3 COPD, presenting for routine f/u. He is following Cards for carotid stenosis/CP. Last visit 7/2020. To f/u next month month. Denies chest pain, weakness, dizziness, syncope.   Lab review:  Renal indices stable   HgA1c 5.2%   PSA 2.04   Total cholesterol 192, HDL 90, LDL 80   TSH   Mild dec in total RBC, H/H   FIT negative 1/4/2020, due now. He denies any changes in stool consistency or bowel patterns. Has not had a colonoscopy and not interested.   LDCT for lung cancer screening 8/2020: benign.     Cardiac tests:    Echo (7/29/2019) EF 50 to 55%.   Coronary angiogram (7/12/2019) normal coronaries   Ultrasound the lower extremities (1.6.20)    The Doppler waveforms are multiphasic at the ankle bilaterally    The resting ABIs are normal    The post-stress ABIs are normal  No evidence of significant arterial insufficiency was identified on the study      He reports previously taken off of statin by Cards d/t past c/o leg pain. States although he's been off for ~1 year, he remains with bilateral aching upper leg pain that is worse with prolonged ambulation or climbing stairs/ladders. States pain has been ongoing for years. Had a work-related injury in the 90s that resulted in him being "slung 70 feet" in the air and landing in some trees. He admits following a chiropractor at that time and was told he had a fracture in right hip. Never had surgery. Pain " "relieved with rest. No imaging on file.       He has no acute concerns today.     (7/7/2021): Mr. Harrington is a 61 yo  male with a PmHx of HTN, HLD, CKD 2, Carotid Stenosis, Hx of abnl stress test/CP (resolved), and severe stage 3 COPD, presenting for routine f/u. He is following Cards for carotid stenosis/CP. F/u 8/17/21.   Lab review:   BUN 26.2/Creatinine 1.23   eGFR 64 mL/min   Ferritin 301.60   Folate level 5.5, low   Vitamin B12 1016  RBC count 3.89   H/H 12.4/38.2   +FIT 1/2021. Referred to GI lab.   Pt underwent XR right femur 1/2021 due to c/o leg pain. XR concerning for bone lesion. F/u imaging revealed 2/19/21:  "A 3.3 cm x 2.5 cm x 2.1 cm intraosseous lesion is present to the   intratrochanteric region of the right femur. 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 He was referred to Dr. Pradeep [facility]audhry in [facility] for further eval. No appt received.      (8/17/2021): Mr. Harrington is a 61 yo  male with a PmHx of HTN, HLD, CKD 2, Carotid Stenosis, Hx of abnl stress test/CP (resolved), and severe stage 3 COPD, presenting for f/u for 6-min walk test due to previous reports of dyspnea on exertion. States most SOB when ambulating for long distances > 100 ft. Tries to walk q evening with wife in the yard. However, he states, "I can only do two laps to her 10."  He normally requires a neb tx after he walks in the evening. He underwent LDCT yesterday that was benign. He has a cardiology appt this am. No other concerns.     (1/7/2022): Mr. Harrington is a 60 yo  male with a PmHx of HTN, HLD, CKD 2, Carotid Stenosis, Hx of abnl stress test/CP (resolved), and severe stage 3 COPD, presenting for f/u. Since last visit, pt now has O2 for PRN use dyspnea 2/2 severe COPD. He currently has a portable cylinder. States O2 has helped his dyspnea and fatigue tremendously. He even reports that leg pain is diminished since starting O2 therapy. He was previously " referred to Dr. 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 Patient admits that he was contacted for an appt. [facility] [place] was told he would need to be vaccinated against COVID in order to be seen at the clinic. He is declining the vaccination and prefers not to pursue further evaluation of the lesion at this time. He will notify the clinic in the future if he changes his mind. He continues to follow cards. Scheduled for colonoscopy 1/20/22 2/2 h/o +FIT. No recent labs. States has lab work due to Cards in the next few weeks; plans to complete labs then. Only requesting medication refills. SBP slightly decreased. Asymptomatic. Declines vaccines. No acute concerns.     (7/8/2022): Mr. Harrington is a 60 yo  male with a PmHx of HTN, HLD, CKD 2, Carotid Stenosis, Hx of abnl stress test/CP (resolved), and severe stage 3 COPD, presenting for 6-month f/u. Patient is s/p colonoscopy 5/12/22 that revealed:  Diverticulosis in the sigmoid colon, in the                          descending colon and in the ascending colon.                          - No specimens collected.                          - The entire examined colon is normal. [All                          Maneuvers].   Recommendation: Repeat colonoscopy in 10 years for screening                          purposes.   Labs reviewed and stable with creatinine slightly increased. Patient has been working outside in the heat and not drinking a lot of water per report. LDCT due next month. Requesting medication refills. He has no other concerns.      (1/30/2023): Mr. Harrington is a 60 yo  male with a PmHx of HTN, HLD, CKD 2, Carotid Stenosis, Hx of abnl stress test/CP (resolved), and severe stage 3 COPD, presenting for 6-month f/u. He completed labs at the end of December that returned stable or WNL. He continues to prescription medications. Previously started on oxygen therapy for CRUZ/severe COPD. [facility] [place] insurance  "requesting updated oxygen testing in order for patient to continue w/ therapy. He does not want to lose the oxygen noting it is very effective in managing his dyspnea. He did have a negative LDCT in August. He relates that he was contacted by a "group" regarding potential asbestos exposure. States a  sent him for testing which revealed +asbestos. Certain recs were made, and he was told to give info to his PCP. He is requesting medication refills. It appears he was lost to f/u in Cards. No visit in 1 year. Denies CP. No other concerns.      Today's Visit (7/31/2023): Mr. Harrington is a 63 yo  male with a PmHx of HTN, HLD, CKD 2, Carotid Stenosis, Hx of abnl stress test/CP (resolved), and severe stage 3 COPD, +asbestos exposure, presenting for 6-month f/u. He completed labs last week which revealed an improved CBC (as compared to December) and unremarkable CMP. He continues with his prescription medications. Requesting refills. He did have a negative LDCT in August 2022 and appreciates new order for annual scan. Last visit in Cards 3/14/23. At that visit, his SBP was slightly above goal; similar to today. He reports BP WNL at home. So, he was told to keep a log and bring to next appt. In March, his home log revealed a similarly elevated SBP, but over the last 1-2 months, his BP has ranged from: -140/ DBP 67-88. He states he did start taking his Metoprolol at night due dizziness during the day when taking all of her routine medications and going out into the heat. Changing his regimen on times has improved his symptoms. Denies CP. No new or worsening dyspnea. No other concerns.         Review of Systems     Review of Systems   Constitutional: Negative.  Negative for fever and unexpected weight change.   HENT: Negative.  Negative for congestion, dental problem, drooling, tinnitus, trouble swallowing and voice change.    Eyes: Negative.  Negative for photophobia, pain, discharge, redness, itching and " visual disturbance.   Respiratory:  Positive for shortness of breath (chronic; no new or worsening). Negative for apnea, cough, choking, chest tightness, wheezing and stridor.    Cardiovascular: Negative.  Negative for chest pain, palpitations and leg swelling.   Gastrointestinal: Negative.    Endocrine: Negative.  Negative for polydipsia, polyphagia and polyuria.   Genitourinary: Negative.  Negative for decreased urine volume, difficulty urinating, dysuria, enuresis, flank pain, frequency, genital sores, hematuria, penile discharge, penile pain, penile swelling, scrotal swelling, testicular pain and urgency.   Musculoskeletal: Negative.  Negative for arthralgias, back pain, gait problem, joint swelling, myalgias, neck pain and neck stiffness.   Skin: Negative.    Allergic/Immunologic: Negative.    Neurological: Negative.  Negative for dizziness, tremors, seizures, syncope, facial asymmetry, speech difficulty, weakness, light-headedness, numbness and headaches.   Hematological: Negative.  Negative for adenopathy. Does not bruise/bleed easily.   Psychiatric/Behavioral: Negative.  Negative for agitation, behavioral problems, confusion, decreased concentration, dysphoric mood, hallucinations, self-injury, sleep disturbance and suicidal ideas. The patient is not nervous/anxious and is not hyperactive.        Medical / Social / Family History     Past Medical History:   Diagnosis Date    Anemia, unspecified     Cardiovascular disease     COPD (chronic obstructive pulmonary disease)     Hypertension     Mixed hyperlipidemia     Prediabetes     Stage 2 chronic kidney disease        Past Surgical History:   Procedure Laterality Date    COLONOSCOPY N/A 5/12/2022    Procedure: COLONOSCOPY;  Surgeon: Tai Barrios MD;  [facility] ENDOSCOPY;  Service: Endoscopy;  Laterality: N/A;       Social History    reports that he quit smoking about 6 years ago. His smoking use included cigarettes. He started smoking about 14  "years ago. He has never used smokeless tobacco. He reports current alcohol use of about 2.0 - 4.0 standard drinks of alcohol per week. He reports that he does not use drugs.    Family History  's family history includes Diabetes type II in his mother; Hypertension in his father; Lung cancer in his mother.    Medications and Allergies     Medications  Current Outpatient Medications   Medication Instructions    albuterol (PROVENTIL/VENTOLIN HFA) 90 mcg/actuation inhaler 2 puffs, Inhalation, Every 6 hours PRN, Rescue    albuterol-ipratropium (DUO-NEB) 2.5 mg-0.5 mg/3 mL nebulizer solution 3 mLs, Nebulization, Every 4-6 hours PRN    amLODIPine (NORVASC) 10 mg, Oral, Daily    aspirin (ECOTRIN) 81 mg, Oral, Daily    atorvastatin (LIPITOR) 10 mg, Oral, Nightly    benazepriL (LOTENSIN) 40 mg, Oral, Daily    folic acid (FOLVITE) 1 mg, Oral, Daily    isosorbide mononitrate (IMDUR) 30 mg, Oral, Daily    metoprolol succinate (TOPROL-XL) 25 mg, Oral, Daily    nitroGLYCERIN (NITROSTAT) 0.4 mg, Sublingual, Every 5 min PRN    tiotropium-olodateroL (STIOLTO RESPIMAT) 2.5-2.5 mcg/actuation Mist 2 puffs, Inhalation, Daily       Allergies  Review of patient's allergies indicates:   Allergen Reactions    Penicillins Swelling    Penicillin      Other reaction(s): Swelling       Physical Examination   Visit Vitals  BP (!) 141/77 (BP Location: Left arm, Patient Position: Sitting, BP Method: Medium (Automatic))   Pulse 68   Temp 98 °F (36.7 °C) (Oral)   Resp 20   Ht 6' 0.99" (1.854 m)   Wt 74.8 kg (165 lb)   BMI 21.77 kg/m²     Physical Exam  Constitutional:       Appearance: Normal appearance.   HENT:      Head: Normocephalic and atraumatic.      Right Ear: External ear normal.      Left Ear: External ear normal.   Eyes:      Extraocular Movements: Extraocular movements intact.      Conjunctiva/sclera: Conjunctivae normal.   Cardiovascular:      Rate and Rhythm: Normal rate and regular rhythm.      Pulses: Normal pulses.      Heart " sounds: Normal heart sounds.   Pulmonary:      Effort: Pulmonary effort is normal.      Breath sounds: Examination of the right-upper field reveals decreased breath sounds. Examination of the left-upper field reveals decreased breath sounds. Examination of the right-middle field reveals decreased breath sounds. Examination of the left-middle field reveals decreased breath sounds. Examination of the right-lower field reveals decreased breath sounds. Examination of the left-lower field reveals decreased breath sounds. Decreased breath sounds present. No wheezing or rhonchi.      Comments: BBS diminished posteriorly--this is his baseline  Abdominal:      Palpations: Abdomen is soft. There is no mass.   Musculoskeletal:         General: Normal range of motion.      Right lower leg: No edema.      Left lower leg: No edema.   Skin:     General: Skin is warm and dry.   Neurological:      General: No focal deficit present.      Mental Status: He is alert and oriented to person, place, and time.   Psychiatric:         Mood and Affect: Mood normal.         Behavior: Behavior normal.         Thought Content: Thought content normal.         Judgment: Judgment normal.           Results     Chemistry:  Lab Results   Component Value Date     07/27/2023    K 4.1 07/27/2023    CHLORIDE 108 (H) 07/27/2023    BUN 22.6 07/27/2023    CREATININE 1.13 07/27/2023    EGFRNORACEVR >60 07/27/2023    GLUCOSE 93 07/27/2023    CALCIUM 9.5 07/27/2023    ALKPHOS 80 07/27/2023    LABPROT 7.2 07/27/2023    ALBUMIN 4.0 07/27/2023    BILIDIR 0.2 01/19/2022    IBILI 0.10 01/19/2022    AST 19 07/27/2023    ALT 14 07/27/2023        Lab Results   Component Value Date    HGBA1C 5.1 12/29/2022        Hematology:  Lab Results   Component Value Date    WBC 5.80 07/27/2023    HGB 13.2 (L) 07/27/2023    HCT 39.6 (L) 07/27/2023     07/27/2023       Lipid Panel:  Lab Results   Component Value Date    CHOL 177 12/29/2022    HDL 92 (H) 12/29/2022     LDL 68.00 12/29/2022    TRIG 86 12/29/2022    TOTALCHOLEST 2 12/29/2022        Urine:  Lab Results   Component Value Date    COLORUA Light-Yellow 12/29/2022    APPEARANCEUA Clear 12/29/2022    SGUA 1.016 12/29/2022    PHUA 5.0 12/29/2022    PROTEINUA Negative 12/29/2022    GLUCOSEUA Normal 12/29/2022    KETONESUA Negative 12/29/2022    BLOODUA Negative 12/29/2022    NITRITESUA Negative 12/29/2022    LEUKOCYTESUR Negative 12/29/2022    RBCUA 0-5 12/29/2022    WBCUA 0-5 12/29/2022    BACTERIA None Seen 12/29/2022    SQEPUA None Seen 12/29/2022    HYALINECASTS None Seen 12/29/2022          Assessment        ICD-10-CM ICD-9-CM   1. Severe chronic obstructive pulmonary disease  J44.9 496   2. Hypertension, unspecified type  I10 401.9   3. Dietary folate deficiency anemia  D52.0 281.2   4. History of arteriosclerotic cardiovascular disease  Z86.79 V12.59   5. Mixed hyperlipidemia  E78.2 272.2   6. Ex-smoker  Z87.891 V15.82   7. Oxygen dependent  Z99.81 V46.2   8. Prediabetes  R73.03 790.29   9. Screening for prostate cancer  Z12.5 V76.44   10. Wellness examination  Z00.00 V70.0        Plan (including Health Maintenance)     Problem List Items Addressed This Visit          Pulmonary    Severe chronic obstructive pulmonary disease - Primary    Overview     PFTs 1/2019 FEV1 42 with FEV1/FEV ratio 44  8/2021 annual lung cancer screening- benign  Continue smoking cessation (quit 5 years ago)           Current Assessment & Plan     Continue DuoNebs/ProAir PRN, Stiolto as scheduled. Continues to report CRUZ such as ambulation > 100 ft, mowing lawn, etc but symptoms managed well with O2  Continue O2 therapy         Relevant Medications    tiotropium-olodateroL (STIOLTO RESPIMAT) 2.5-2.5 mcg/actuation Mist    albuterol-ipratropium (DUO-NEB) 2.5 mg-0.5 mg/3 mL nebulizer solution    albuterol (PROVENTIL/VENTOLIN HFA) 90 mcg/actuation inhaler    Oxygen dependent       Cardiac/Vascular    Hypertension    Current Assessment & Plan      Home BP log show BP overall improved. Relates recent increases in BP to weather. Enc to stay out of heat if possible and hydrate  Continue current medications  DASH         Relevant Medications    metoprolol succinate (TOPROL-XL) 25 MG 24 hr tablet    amLODIPine (NORVASC) 10 MG tablet    aspirin (ECOTRIN) 81 MG EC tablet    benazepriL (LOTENSIN) 40 MG tablet    Other Relevant Orders    Urinalysis, Reflex to Urine Culture    TSH    Comprehensive Metabolic Panel    Mixed hyperlipidemia    Relevant Medications    atorvastatin (LIPITOR) 10 MG tablet    Other Relevant Orders    Lipid Panel       Oncology    Dietary folate deficiency anemia    Current Assessment & Plan     Continue folic acid 1 mg po daily         Relevant Medications    folic acid (FOLVITE) 1 MG tablet    Other Relevant Orders    CBC Auto Differential       Endocrine    Prediabetes    Relevant Orders    Hemoglobin A1C       Other    Ex-smoker    Relevant Orders    CT Chest Lung Screening Low Dose    Wellness examination    Overview     Colon Cancer Screening: s/p colonoscopy 22 that revealed: Diverticulosis in the sigmoid colon, in the                          descending colon and in the ascending colon.                          - No specimens collected.                          - The entire examined colon is normal. [All                          Maneuvers  Recommendation: Repeat colonoscopy in 10 years for screening purposes.   Prostate Cancer Screening:    Latest Reference Range & Units 22 06:30   PSA, Screen <=4.00 ng/mL 1.97   Lung Cancer Screenin2022 Lung-RADS Category:  2 - Benign Appearance or Behavior - continue annual screening with LDCT in 12 months.            Other Visit Diagnoses       History of arteriosclerotic cardiovascular disease        Relevant Medications    aspirin (ECOTRIN) 81 MG EC tablet    atorvastatin (LIPITOR) 10 MG tablet    isosorbide mononitrate (IMDUR) 30 MG 24 hr tablet    Screening for prostate cancer         Relevant Orders    PSA, Screening              Health Maintenance Due   Topic Date Due    COVID-19 Vaccine (1) Never done    Pneumococcal Vaccines (Age 0-64) (1 - PCV) Never done    Shingles Vaccine (1 of 2) Never done       Future Appointments   Date Time Provider Department Center   2/1/2024 12:30 PM ZACARIAS Cook [facility]        For any new or worsening symptoms that are urgent, or for any s/s of MI, CVA, or any other emergent concerns, please visit the ER for further eval. Otherwise, call clinic with questions or concerns.      Follow up in about 6 months (around 1/31/2024).    Signature:  ZACARIAS Cook  [facility]  [address] [facility] [address]    Date of encounter: 7/31/23

## 2023-07-31 NOTE — ASSESSMENT & PLAN NOTE
Home BP log show BP overall improved. Relates recent increases in BP to weather. Enc to stay out of heat if possible and hydrate  Continue current medications  DASH

## 2023-07-31 NOTE — LETTER
I certify that (Name) Len Goss meets the requirements as outlined in #  (shown on reverse side) and qualifies for a mobility impaired license plate/hang-tag. I further understand that willful and false certification shall subject me to fines/imprisonment as outlined in R.S. 47:463.4 (G) (4). The applicant's information is as follows:    YOB: 1960            Race:White        Gender:Male    Address:  [address] [place] [address]    City:[place]                               State:[place]     Zip Code:[address]     []Permanently Impaired - Applicant has a total or lifelong condition of mobility impairment from which little or no improvement or recovery can reasonably be expected. A medical examiners certification is required on initial application only.      [] Temporarily Impaired - Applicant has a temporary condition of mobility impairment of which improvement or recovery can reasonably be expected. Applicant is entitled to a hangtag, which will be valid for one (1) year. A medical examiners certification is required for the renewal of the hangtag      [] Unable to appear in person at the Office of Motor Vehicles - Applicant must bring facial photo        Medical Examiner's Signature________________________________________ Date:7/31/23_________________________    Printed Name:___________________________________________________    State License #_____________________________    Address: [address] [place] [address]___                                     Phone Number: [phone]                                                                                                                                                                                                                  City: [place]__________________________________ State: [place] __Zip Code: [address] _______________    TO BE COMPLETED BY MOTOR VEHICLE ANALYST ONLY    YULIANA   Lic. Plate #      Hangtag Control #   Hangtag ID #      Date Issued:     #:   Office #:

## 2023-08-24 ENCOUNTER — HOSPITAL ENCOUNTER (OUTPATIENT)
Dept: RADIOLOGY | Facility: HOSPITAL | Age: 63
Discharge: HOME | End: 2023-08-24
Attending: NURSE PRACTITIONER
Payer: MEDICARE

## 2023-08-24 DIAGNOSIS — Z87.891 EX-SMOKER: ICD-10-CM

## 2023-08-24 PROCEDURE — 71271 CT THORAX LUNG CANCER SCR C-: CPT | Mod: TC

## 2023-08-25 ENCOUNTER — TELEPHONE (OUTPATIENT)
Dept: INTERNAL MEDICINE | Facility: CLINIC | Age: 63
End: 2023-08-25
Payer: MEDICARE

## 2023-08-25 NOTE — TELEPHONE ENCOUNTER
Please inform patient of negative lung cancer screening.  Lung-RADS Category:  2 - Benign Appearance or Behavior - continue annual screening with LDCT in 12 months.

## 2023-10-30 PROBLEM — Z00.00 WELLNESS EXAMINATION: Status: RESOLVED | Noted: 2023-01-30 | Resolved: 2023-10-30

## 2024-01-25 ENCOUNTER — LAB VISIT (OUTPATIENT)
Dept: LAB | Facility: HOSPITAL | Age: 64
End: 2024-01-25
Attending: NURSE PRACTITIONER
Payer: MEDICARE

## 2024-01-25 DIAGNOSIS — R73.03 PREDIABETES: ICD-10-CM

## 2024-01-25 DIAGNOSIS — Z12.5 SCREENING FOR PROSTATE CANCER: ICD-10-CM

## 2024-01-25 DIAGNOSIS — E78.2 MIXED HYPERLIPIDEMIA: ICD-10-CM

## 2024-01-25 DIAGNOSIS — I10 HYPERTENSION, UNSPECIFIED TYPE: ICD-10-CM

## 2024-01-25 DIAGNOSIS — D52.0 DIETARY FOLATE DEFICIENCY ANEMIA: ICD-10-CM

## 2024-01-25 LAB
ALBUMIN SERPL-MCNC: 4.3 G/DL (ref 3.4–4.8)
ALBUMIN/GLOB SERPL: 1.3 RATIO (ref 1.1–2)
ALP SERPL-CCNC: 76 UNIT/L (ref 40–150)
ALT SERPL-CCNC: 16 UNIT/L (ref 0–55)
APPEARANCE UR: CLEAR
AST SERPL-CCNC: 20 UNIT/L (ref 5–34)
BACTERIA #/AREA URNS AUTO: ABNORMAL /HPF
BASOPHILS # BLD AUTO: 0.06 X10(3)/MCL
BASOPHILS NFR BLD AUTO: 1.1 %
BILIRUB SERPL-MCNC: 0.6 MG/DL
BILIRUB UR QL STRIP.AUTO: NEGATIVE
BUN SERPL-MCNC: 17.8 MG/DL (ref 8.4–25.7)
CALCIUM SERPL-MCNC: 9.6 MG/DL (ref 8.8–10)
CHLORIDE SERPL-SCNC: 108 MMOL/L (ref 98–107)
CHOLEST SERPL-MCNC: 162 MG/DL
CHOLEST/HDLC SERPL: 2 {RATIO} (ref 0–5)
CO2 SERPL-SCNC: 25 MMOL/L (ref 23–31)
COLOR UR AUTO: ABNORMAL
CREAT SERPL-MCNC: 1.02 MG/DL (ref 0.73–1.18)
EOSINOPHIL # BLD AUTO: 0.12 X10(3)/MCL (ref 0–0.9)
EOSINOPHIL NFR BLD AUTO: 2.3 %
ERYTHROCYTE [DISTWIDTH] IN BLOOD BY AUTOMATED COUNT: 11.9 % (ref 11.5–17)
EST. AVERAGE GLUCOSE BLD GHB EST-MCNC: 99.7 MG/DL
GFR SERPLBLD CREATININE-BSD FMLA CKD-EPI: >60 MLS/MIN/1.73/M2
GLOBULIN SER-MCNC: 3.4 GM/DL (ref 2.4–3.5)
GLUCOSE SERPL-MCNC: 113 MG/DL (ref 82–115)
GLUCOSE UR QL STRIP.AUTO: NORMAL
HBA1C MFR BLD: 5.1 %
HCT VFR BLD AUTO: 42.7 % (ref 42–52)
HDLC SERPL-MCNC: 81 MG/DL (ref 35–60)
HGB BLD-MCNC: 14.5 G/DL (ref 14–18)
HYALINE CASTS #/AREA URNS LPF: ABNORMAL /LPF
IMM GRANULOCYTES # BLD AUTO: 0.01 X10(3)/MCL (ref 0–0.04)
IMM GRANULOCYTES NFR BLD AUTO: 0.2 %
KETONES UR QL STRIP.AUTO: NEGATIVE
LDLC SERPL CALC-MCNC: 58 MG/DL (ref 50–140)
LEUKOCYTE ESTERASE UR QL STRIP.AUTO: NEGATIVE
LYMPHOCYTES # BLD AUTO: 1.44 X10(3)/MCL (ref 0.6–4.6)
LYMPHOCYTES NFR BLD AUTO: 27.1 %
MCH RBC QN AUTO: 32.7 PG (ref 27–31)
MCHC RBC AUTO-ENTMCNC: 34 G/DL (ref 33–36)
MCV RBC AUTO: 96.4 FL (ref 80–94)
MONOCYTES # BLD AUTO: 0.59 X10(3)/MCL (ref 0.1–1.3)
MONOCYTES NFR BLD AUTO: 11.1 %
MUCOUS THREADS URNS QL MICRO: ABNORMAL /LPF
NEUTROPHILS # BLD AUTO: 3.09 X10(3)/MCL (ref 2.1–9.2)
NEUTROPHILS NFR BLD AUTO: 58.2 %
NITRITE UR QL STRIP.AUTO: NEGATIVE
NRBC BLD AUTO-RTO: 0 %
PH UR STRIP.AUTO: 5.5 [PH]
PLATELET # BLD AUTO: 188 X10(3)/MCL (ref 130–400)
PMV BLD AUTO: 11.7 FL (ref 7.4–10.4)
POTASSIUM SERPL-SCNC: 4.2 MMOL/L (ref 3.5–5.1)
PROT SERPL-MCNC: 7.7 GM/DL (ref 5.8–7.6)
PROT UR QL STRIP.AUTO: NEGATIVE
PSA SERPL-MCNC: 1.56 NG/ML
RBC # BLD AUTO: 4.43 X10(6)/MCL (ref 4.7–6.1)
RBC #/AREA URNS AUTO: ABNORMAL /HPF
RBC UR QL AUTO: NEGATIVE
SODIUM SERPL-SCNC: 141 MMOL/L (ref 136–145)
SP GR UR STRIP.AUTO: 1.02 (ref 1–1.03)
SQUAMOUS #/AREA URNS LPF: ABNORMAL /HPF
TRIGL SERPL-MCNC: 113 MG/DL (ref 34–140)
TSH SERPL-ACNC: 2.41 UIU/ML (ref 0.35–4.94)
UROBILINOGEN UR STRIP-ACNC: NORMAL
VLDLC SERPL CALC-MCNC: 23 MG/DL
WBC # SPEC AUTO: 5.31 X10(3)/MCL (ref 4.5–11.5)
WBC #/AREA URNS AUTO: ABNORMAL /HPF

## 2024-01-25 PROCEDURE — 80053 COMPREHEN METABOLIC PANEL: CPT

## 2024-01-25 PROCEDURE — 36415 COLL VENOUS BLD VENIPUNCTURE: CPT

## 2024-01-25 PROCEDURE — 80061 LIPID PANEL: CPT

## 2024-01-25 PROCEDURE — 84153 ASSAY OF PSA TOTAL: CPT

## 2024-01-25 PROCEDURE — 83036 HEMOGLOBIN GLYCOSYLATED A1C: CPT

## 2024-01-25 PROCEDURE — 81001 URINALYSIS AUTO W/SCOPE: CPT

## 2024-01-25 PROCEDURE — 84443 ASSAY THYROID STIM HORMONE: CPT

## 2024-01-25 PROCEDURE — 85025 COMPLETE CBC W/AUTO DIFF WBC: CPT

## 2024-01-29 DIAGNOSIS — I10 HYPERTENSION, UNSPECIFIED TYPE: ICD-10-CM

## 2024-01-29 DIAGNOSIS — Z86.79 HISTORY OF ARTERIOSCLEROTIC CARDIOVASCULAR DISEASE: ICD-10-CM

## 2024-01-29 DIAGNOSIS — E78.2 MIXED HYPERLIPIDEMIA: ICD-10-CM

## 2024-01-31 RX ORDER — BENAZEPRIL HYDROCHLORIDE 40 MG/1
40 TABLET ORAL
Qty: 90 TABLET | Refills: 0 | Status: SHIPPED | OUTPATIENT
Start: 2024-01-31 | End: 2024-05-22 | Stop reason: SDUPTHER

## 2024-01-31 RX ORDER — ATORVASTATIN CALCIUM 10 MG/1
10 TABLET, FILM COATED ORAL NIGHTLY
Qty: 90 TABLET | Refills: 0 | Status: SHIPPED | OUTPATIENT
Start: 2024-01-31 | End: 2024-05-22 | Stop reason: SDUPTHER

## 2024-01-31 RX ORDER — ISOSORBIDE MONONITRATE 30 MG/1
30 TABLET, EXTENDED RELEASE ORAL
Qty: 90 TABLET | Refills: 0 | Status: SHIPPED | OUTPATIENT
Start: 2024-01-31 | End: 2024-05-23 | Stop reason: SDUPTHER

## 2024-01-31 RX ORDER — AMLODIPINE BESYLATE 10 MG/1
10 TABLET ORAL
Qty: 90 TABLET | Refills: 0 | Status: SHIPPED | OUTPATIENT
Start: 2024-01-31 | End: 2024-05-23 | Stop reason: SDUPTHER

## 2024-02-28 DIAGNOSIS — I10 HYPERTENSION, UNSPECIFIED TYPE: ICD-10-CM

## 2024-03-04 RX ORDER — METOPROLOL SUCCINATE 25 MG/1
25 TABLET, EXTENDED RELEASE ORAL
Qty: 30 TABLET | Refills: 1 | Status: SHIPPED | OUTPATIENT
Start: 2024-03-04 | End: 2024-05-23 | Stop reason: SDUPTHER

## 2024-05-02 ENCOUNTER — TELEPHONE (OUTPATIENT)
Dept: INTERNAL MEDICINE | Facility: CLINIC | Age: 64
End: 2024-05-02
Payer: MEDICARE

## 2024-05-02 NOTE — TELEPHONE ENCOUNTER
----- Message from Cindy Sesay sent at 5/2/2024  9:46 AM CDT -----  Who Called: Len Goss    Refill or New Rx:Refill      Preferred Method of Contact: Phone Call  Patient's Preferred Phone Number on File: [phone]   Best Call Back Number, if different:    Additional Information: pt called stated that he needs refill on 2 med doesn't know name - asked clinic [id number] , to determine   f# [id number]        Type:  Needs Medical Advice    Who Called: pt   Additional Information: pt also would like to confirm if labs will be needed before appt on 06/13

## 2024-05-02 NOTE — TELEPHONE ENCOUNTER
Spoke to pt. Pt states he spoke to someone with  [facility] whom told him he needs to contact PCP for medication refill. Pt stated e was not told what medications needed refills, but states he is not out of medication . [facility] at  [phone]. Unable to speak to representative/pharmacist for verification of medication

## 2024-05-22 DIAGNOSIS — Z86.79 HISTORY OF ARTERIOSCLEROTIC CARDIOVASCULAR DISEASE: ICD-10-CM

## 2024-05-22 DIAGNOSIS — I10 HYPERTENSION, UNSPECIFIED TYPE: ICD-10-CM

## 2024-05-22 DIAGNOSIS — E78.2 MIXED HYPERLIPIDEMIA: ICD-10-CM

## 2024-05-22 RX ORDER — ATORVASTATIN CALCIUM 10 MG/1
10 TABLET, FILM COATED ORAL NIGHTLY
Qty: 90 TABLET | Refills: 0 | Status: SHIPPED | OUTPATIENT
Start: 2024-05-22

## 2024-05-22 RX ORDER — BENAZEPRIL HYDROCHLORIDE 40 MG/1
40 TABLET ORAL DAILY
Qty: 90 TABLET | Refills: 0 | Status: SHIPPED | OUTPATIENT
Start: 2024-05-22

## 2024-05-22 NOTE — TELEPHONE ENCOUNTER
----- Message from Maryana Estevez sent at 5/22/2024 11:38 AM CDT -----  .Type:  RX Refill Request    Who Called: PT  Refill or New Rx:REFILL  RX Name and Strength:atorvastatin (LIPITOR) 10 MG tablet  How is the patient currently taking it? (ex. 1XDay):1/day  Is this a 30 day or 90 day RX:90  Preferred Pharmacy with phone number:[facility]  Local or Mail Order:Mail Order  Ordering Provider:Teodoro  Would the patient rather a call back or a response via [facility]?   Best Call Back Number:[phone]  Additional Information: atorvastatin (LIPITOR) 10 MG tablet     .Type:  RX Refill Request    Who Called: pt  Refill or New Rx:refill  RX Name and Strength:benazepriL (LOTENSIN) 40 MG tablet  How is the patient currently taking it? (ex. 1XDay):1/DAY  Is this a 30 day or 90 day RX:90  Preferred Pharmacy with phone number:[facility]  Local or Mail Order:Mail Order  Ordering Provider:Teodoro  Would the patient rather a call back or a response via [facility]?   Best Call Back Number:[phone]  Additional Information: benazepriL (LOTENSIN) 40 MG tablet    Please put the patient's name and date of birth when sending refill  Please send to fax# [phone]

## 2024-05-22 NOTE — TELEPHONE ENCOUNTER
Pt requesting refill for atorvastatin (LIPITOR) 10 MG tablet and benazepriL (LOTENSIN) 40 MG tablet     LOV: 7/31/24    NOV: 6/13/24

## 2024-05-23 DIAGNOSIS — I10 HYPERTENSION, UNSPECIFIED TYPE: ICD-10-CM

## 2024-05-23 DIAGNOSIS — Z86.79 HISTORY OF ARTERIOSCLEROTIC CARDIOVASCULAR DISEASE: ICD-10-CM

## 2024-05-23 RX ORDER — AMLODIPINE BESYLATE 10 MG/1
10 TABLET ORAL DAILY
Qty: 90 TABLET | Refills: 1 | Status: SHIPPED | OUTPATIENT
Start: 2024-05-23

## 2024-05-23 RX ORDER — ISOSORBIDE MONONITRATE 30 MG/1
30 TABLET, EXTENDED RELEASE ORAL DAILY
Qty: 90 TABLET | Refills: 1 | Status: SHIPPED | OUTPATIENT
Start: 2024-05-23 | End: 2024-11-19

## 2024-05-23 RX ORDER — METOPROLOL SUCCINATE 25 MG/1
25 TABLET, EXTENDED RELEASE ORAL DAILY
Qty: 90 TABLET | Refills: 1 | Status: SHIPPED | OUTPATIENT
Start: 2024-05-23

## 2024-05-23 NOTE — TELEPHONE ENCOUNTER
----- Message from Maryana Estevez sent at 5/22/2024 11:34 AM CDT -----  .Type:  RX Refill Request    Who Called: pt  Refill or New Rx:refill  RX Name and Strength:isosorbide mononitrate (IMDUR) 30 MG 24 hr tablet  How is the patient currently taking it? (ex. 1XDay):1/day  Is this a 30 day or 90 day RX:90  Preferred Pharmacy with phone number:[facility]  Local or Mail Order:Mail Order  Ordering Provider:Teodoro  Would the patient rather a call back or a response via [facility]? cb  Best Call Back Number:[phone]  Additional Information: isosorbide mononitrate (IMDUR) 30 MG 24 hr tablet     Please send to [facility] Fax [phone]    .Type:  RX Refill Request    Who Called: pt  Refill or New Rx:refill  RX Name and Strength:metoprolol succinate (TOPROL-XL) 25 MG 24 hr tablet  How is the patient currently taking it? (ex. 1XDay):1/day  Is this a 30 day or 90 day RX:90  Preferred Pharmacy with phone number:[facility]  Local or Mail Order:Mail Order  Ordering Provider:Teodoro  Would the patient rather a call back or a response via [facility]? CB  Best Call Back Number:[phone]  Additional Information: metoprolol succinate (TOPROL-XL) 25 MG 24 hr tablet    .Type:  RX Refill Request    Who Called: PT  Refill or New Rx:REFILL  RX Name and Strength:amLODIPine (NORVASC) 10 MG tablet  How is the patient currently taking it? (ex. 1XDay):1/DAY  Is this a 30 day or 90 day RX:90  Preferred Pharmacy with phone number:[facility]  Local or Mail Order:Mail Order  Ordering Provider:Teodoro  Would the patient rather a call back or a response via [facility]? CB  Best Call Back Number:[phone]  Additional Information: amLODIPine (NORVASC) 10 MG tablet

## 2024-06-13 ENCOUNTER — OFFICE VISIT (OUTPATIENT)
Dept: INTERNAL MEDICINE | Facility: CLINIC | Age: 64
End: 2024-06-13
Payer: MEDICARE

## 2024-06-13 VITALS
BODY MASS INDEX: 21.58 KG/M2 | HEART RATE: 61 BPM | WEIGHT: 162.81 LBS | RESPIRATION RATE: 20 BRPM | DIASTOLIC BLOOD PRESSURE: 73 MMHG | SYSTOLIC BLOOD PRESSURE: 131 MMHG | TEMPERATURE: 98 F | HEIGHT: 73 IN

## 2024-06-13 DIAGNOSIS — N18.2 STAGE 2 CHRONIC KIDNEY DISEASE: ICD-10-CM

## 2024-06-13 DIAGNOSIS — J44.9 SEVERE CHRONIC OBSTRUCTIVE PULMONARY DISEASE: Primary | ICD-10-CM

## 2024-06-13 DIAGNOSIS — Z87.891 HISTORY OF SMOKING: ICD-10-CM

## 2024-06-13 DIAGNOSIS — E78.2 MIXED HYPERLIPIDEMIA: ICD-10-CM

## 2024-06-13 DIAGNOSIS — I10 HYPERTENSION, UNSPECIFIED TYPE: ICD-10-CM

## 2024-06-13 DIAGNOSIS — R73.03 PREDIABETES: ICD-10-CM

## 2024-06-13 DIAGNOSIS — D52.0 DIETARY FOLATE DEFICIENCY ANEMIA: ICD-10-CM

## 2024-06-13 PROCEDURE — 1160F RVW MEDS BY RX/DR IN RCRD: CPT | Mod: CPTII,,, | Performed by: NURSE PRACTITIONER

## 2024-06-13 PROCEDURE — 3044F HG A1C LEVEL LT 7.0%: CPT | Mod: CPTII,,, | Performed by: NURSE PRACTITIONER

## 2024-06-13 PROCEDURE — 3075F SYST BP GE 130 - 139MM HG: CPT | Mod: CPTII,,, | Performed by: NURSE PRACTITIONER

## 2024-06-13 PROCEDURE — 3008F BODY MASS INDEX DOCD: CPT | Mod: CPTII,,, | Performed by: NURSE PRACTITIONER

## 2024-06-13 PROCEDURE — 99215 OFFICE O/P EST HI 40 MIN: CPT | Mod: PBBFAC | Performed by: NURSE PRACTITIONER

## 2024-06-13 PROCEDURE — 1159F MED LIST DOCD IN RCRD: CPT | Mod: CPTII,,, | Performed by: NURSE PRACTITIONER

## 2024-06-13 PROCEDURE — 4010F ACE/ARB THERAPY RXD/TAKEN: CPT | Mod: CPTII,,, | Performed by: NURSE PRACTITIONER

## 2024-06-13 PROCEDURE — 99214 OFFICE O/P EST MOD 30 MIN: CPT | Mod: S$PBB,,, | Performed by: NURSE PRACTITIONER

## 2024-06-13 PROCEDURE — 3078F DIAST BP <80 MM HG: CPT | Mod: CPTII,,, | Performed by: NURSE PRACTITIONER

## 2024-06-13 RX ORDER — FOLIC ACID 1 MG/1
1 TABLET ORAL DAILY
Qty: 30 TABLET | Refills: 11 | Status: SHIPPED | OUTPATIENT
Start: 2024-06-13 | End: 2025-06-08

## 2024-06-13 RX ORDER — ALBUTEROL SULFATE 90 UG/1
2 AEROSOL, METERED RESPIRATORY (INHALATION) EVERY 6 HOURS PRN
Qty: 18 G | Refills: 3 | Status: SHIPPED | OUTPATIENT
Start: 2024-06-13

## 2024-06-13 RX ORDER — UMECLIDINIUM BROMIDE AND VILANTEROL TRIFENATATE 62.5; 25 UG/1; UG/1
1 POWDER RESPIRATORY (INHALATION) DAILY
Qty: 60 EACH | Refills: 2 | Status: SHIPPED | OUTPATIENT
Start: 2024-06-13

## 2024-06-13 RX ORDER — IPRATROPIUM BROMIDE AND ALBUTEROL SULFATE 2.5; .5 MG/3ML; MG/3ML
3 SOLUTION RESPIRATORY (INHALATION)
Qty: 75 ML | Refills: 6 | Status: SHIPPED | OUTPATIENT
Start: 2024-06-13

## 2024-06-13 RX ORDER — TIOTROPIUM BROMIDE AND OLODATEROL 3.124; 2.736 UG/1; UG/1
2 SPRAY, METERED RESPIRATORY (INHALATION) DAILY
Qty: 4 G | Refills: 6 | Status: SHIPPED | OUTPATIENT
Start: 2024-06-13

## 2024-06-13 NOTE — PROGRESS NOTES
[facility]  Asher Chowdhury DNP     Patient ID: [id number]     Chief Complaint: Follow-up (Lab results 24, refused vaccines, needs refills, handicap tag)      HPI:     Len Goss is a 63 y.o. male here today for follow-up with PCP.     PmHx of HTN, HLD, CKD 2, Carotid Stenosis, Hx of abnl stress test/CP (resolved), and severe stage 3 COPD, +asbestos exposure.  He is using portable O2 at 2-3 L per nasal cannula.  Denies any new symptoms today, feels well.    Health Maintenance         Date Due Completion Date    RSV Vaccine (Age 60+ and Pregnant patients) (1 - 1-dose 60+ series) Never done ---    COVID-19 Vaccine ( season) Never done ---    Shingles Vaccine (1 of 2) 2025 (Originally 2009) ---    Influenza Vaccine (Season Ended) 2024 ---    PROSTATE-SPECIFIC ANTIGEN 2025    Hemoglobin A1c (Prediabetes) 2025    Lipid Panel 2029    TETANUS VACCINE 04/15/2029 4/15/2019    Colorectal Cancer Screening 2032            Past Medical History:   Diagnosis Date    Anemia, unspecified     Cardiovascular disease     COPD (chronic obstructive pulmonary disease)     Hypertension     Mixed hyperlipidemia     Prediabetes     Stage 2 chronic kidney disease         Past Surgical History:   Procedure Laterality Date    COLONOSCOPY N/A 2022    Procedure: COLONOSCOPY;  Surgeon: Tia Barrios MD;  Location: [facility] ENDOSCOPY;  Service: Endoscopy;  Laterality: N/A;        Social History     Tobacco Use    Smoking status: Former     Current packs/day: 0.00     Types: Cigarettes     Start date:      Quit date: 10/1/2016     Years since quittin.7    Smokeless tobacco: Never   Substance and Sexual Activity    Alcohol use: Yes     Alcohol/week: 2.0 - 4.0 standard drinks of alcohol     Types: 2 - 4 Cans of beer per week     Comment: daily    Drug use: Never    Sexual activity: Yes     Partners: Female         Current Outpatient Medications   Medication Instructions    albuterol (PROVENTIL/VENTOLIN HFA) 90 mcg/actuation inhaler 2 puffs, Inhalation, Every 6 hours PRN, Rescue    albuterol-ipratropium (DUO-NEB) 2.5 mg-0.5 mg/3 mL nebulizer solution 3 mLs, Nebulization, Every 4-6 hours PRN    amLODIPine (NORVASC) 10 mg, Oral, Daily    aspirin (ECOTRIN) 81 mg, Oral, Daily    atorvastatin (LIPITOR) 10 mg, Oral, Nightly    benazepriL (LOTENSIN) 40 mg, Oral, Daily    folic acid (FOLVITE) 1 mg, Oral, Daily    isosorbide mononitrate (IMDUR) 30 mg, Oral, Daily    metoprolol succinate (TOPROL-XL) 25 mg, Oral, Daily    nitroGLYCERIN (NITROSTAT) 0.4 mg, Sublingual, Every 5 min PRN    tiotropium-olodateroL (STIOLTO RESPIMAT) 2.5-2.5 mcg/actuation Mist 2 puffs, Inhalation, Daily    umeclidinium-vilanteroL (ANORO ELLIPTA) 62.5-25 mcg/actuation DsDv 1 puff, Inhalation, Daily, Controller       Review of patient's allergies indicates:   Allergen Reactions    Penicillins Swelling    Penicillin      Other reaction(s): Swelling        Patient Care Team:  Asher Chowdhury FNP as PCP - General (Family Medicine)     Subjective:     Review of Systems   Constitutional:  Negative for appetite change, chills, diaphoresis and fever.   HENT:  Negative for ear pain, sinus pain and sore throat.    Eyes:  Negative for pain and visual disturbance.   Respiratory:  Negative for cough, shortness of breath and wheezing.    Cardiovascular:  Negative for chest pain, palpitations and leg swelling.   Gastrointestinal:  Negative for abdominal pain, blood in stool, diarrhea, nausea and vomiting.   Endocrine: Negative for cold intolerance.   Genitourinary:  Negative for difficulty urinating, dysuria, frequency and hematuria.   Musculoskeletal:  Negative for arthralgias, joint swelling and myalgias.   Skin:  Negative for color change and rash.   Allergic/Immunologic: Negative.    Neurological: Negative.  Negative for dizziness, syncope, light-headedness and  "numbness.   Hematological: Negative.    Psychiatric/Behavioral: Negative.  Negative for dysphoric mood and suicidal ideas. The patient is not nervous/anxious.    All other systems reviewed and are negative.      12 point review of systems conducted, negative except as stated in the history of present illness. See HPI for details.    Objective:     Visit Vitals  /73 (BP Location: Left arm, Patient Position: Sitting, BP Method: Medium (Automatic))   Pulse 61   Temp 97.5 °F (36.4 °C) (Oral)   Resp 20   Ht 6' 0.99" (1.854 m)   Wt 73.8 kg (162 lb 12.8 oz)   BMI 21.48 kg/m²       Physical Exam  Vitals and nursing note reviewed.   Constitutional:       General: He is not in acute distress.     Appearance: He is not ill-appearing.   HENT:      Head: Normocephalic and atraumatic.      Mouth/Throat:      Mouth: Mucous membranes are moist.      Pharynx: Oropharynx is clear.   Eyes:      General: No scleral icterus.     Extraocular Movements: Extraocular movements intact.      Conjunctiva/sclera: Conjunctivae normal.      Pupils: Pupils are equal, round, and reactive to light.   Neck:      Vascular: No carotid bruit.   Cardiovascular:      Rate and Rhythm: Normal rate and regular rhythm.      Heart sounds: No murmur heard.     No friction rub. No gallop.   Pulmonary:      Effort: Pulmonary effort is normal. No respiratory distress.      Breath sounds: Normal breath sounds. No wheezing, rhonchi or rales.      Comments: O2 at 2 L per nasal cannula, portable O2  Abdominal:      General: Abdomen is flat. Bowel sounds are normal. There is no distension.      Palpations: Abdomen is soft. There is no mass.      Tenderness: There is no abdominal tenderness.   Musculoskeletal:         General: Normal range of motion.      Cervical back: Normal range of motion and neck supple.   Skin:     General: Skin is warm and dry.   Neurological:      General: No focal deficit present.      Mental Status: He is alert.   Psychiatric:         " Mood and Affect: Mood normal.         Labs Reviewed:     Chemistry:  Lab Results   Component Value Date     01/25/2024    K 4.2 01/25/2024    BUN 17.8 01/25/2024    CREATININE 1.02 01/25/2024    EGFRNORACEVR >60 01/25/2024    GLUCOSE 113 01/25/2024    CALCIUM 9.6 01/25/2024    ALKPHOS 76 01/25/2024    LABPROT 7.7 (H) 01/25/2024    ALBUMIN 4.3 01/25/2024    BILIDIR 0.2 01/19/2022    IBILI 0.10 01/19/2022    AST 20 01/25/2024    ALT 16 01/25/2024    TSH 2.409 01/25/2024    PSA 1.56 01/25/2024        Lab Results   Component Value Date    HGBA1C 5.1 01/25/2024        Hematology:  Lab Results   Component Value Date    WBC 5.31 01/25/2024    HGB 14.5 01/25/2024    HCT 42.7 01/25/2024     01/25/2024       Lipid Panel:  Lab Results   Component Value Date    CHOL 162 01/25/2024    HDL 81 (H) 01/25/2024    LDL 58.00 01/25/2024    TRIG 113 01/25/2024    TOTALCHOLEST 2 01/25/2024        Urine:  Lab Results   Component Value Date    APPEARANCEUA Clear 01/25/2024    SGUA 1.022 01/25/2024    PROTEINUA Negative 01/25/2024    KETONESUA Negative 01/25/2024    LEUKOCYTESUR Negative 01/25/2024    RBCUA None Seen 01/25/2024    WBCUA 0-5 01/25/2024    BACTERIA None Seen 01/25/2024    SQEPUA None Seen 01/25/2024    HYALINECASTS None Seen 01/25/2024        Assessment:       ICD-10-CM ICD-9-CM   1. Severe chronic obstructive pulmonary disease  J44.9 496   2. Dietary folate deficiency anemia  D52.0 281.2   3. History of smoking  Z87.891 V15.82   4. Mixed hyperlipidemia  E78.2 272.2   5. Prediabetes  R73.03 790.29   6. Stage 2 chronic kidney disease  N18.2 585.2   7. Hypertension, unspecified type  I10 401.9        Plan:     1. Severe chronic obstructive pulmonary disease  Overview:  PFTs 1/2019 FEV1 42 with FEV1/FEV ratio 44  8/2021 annual lung cancer screening- benign  Continue smoking cessation (quit 5 years ago)      Orders:  -     tiotropium-olodateroL (STIOLTO RESPIMAT) 2.5-2.5 mcg/actuation Mist; Inhale 2 puffs into the  lungs once daily.  Dispense: 4 g; Refill: 6  -     albuterol (PROVENTIL/VENTOLIN HFA) 90 mcg/actuation inhaler; Inhale 2 puffs into the lungs every 6 (six) hours as needed for Wheezing or Shortness of Breath. Rescue  Dispense: 18 g; Refill: 3  -     albuterol-ipratropium (DUO-NEB) 2.5 mg-0.5 mg/3 mL nebulizer solution; Take 3 mLs by nebulization every 4 to 6 hours as needed for Wheezing or Shortness of Breath.  Dispense: 75 mL; Refill: 6    2. Dietary folate deficiency anemia  -     folic acid (FOLVITE) 1 MG tablet; Take 1 tablet (1 mg total) by mouth Daily.  Dispense: 30 tablet; Refill: 11    3. History of smoking  -     CT Chest Lung Screening Low Dose; Future; Expected date: 09/13/2024    4. Mixed hyperlipidemia  Overview:  Atorvastatin 10 mg nightly    Assessment & Plan:  Counseled for low-sodium, low carb, low-cholesterol, good fat, Dash diet.  Recommend increasing fiber in the diet to lower LDL, increasing fish oil and fish such as salmon may help increase HDL good cholesterol.  Increasing aerobic activity as tolerated may also help lower LDL.  Healthy weight maintenance is advised, portion control, calorie counting, and discussed difference between complex carbs and simple carbs.    Continue current statin therapy.      Orders:  -     Lipid Panel; Future; Expected date: 12/13/2024    5. Prediabetes  Assessment & Plan:  Lab Results   Component Value Date    HGBA1C 5.1 01/25/2024    HGBA1C 5.1 12/29/2022    LDL 58.00 01/25/2024    CREATININE 1.02 01/25/2024       Discussed importance of diabetes prevention through lifestyle and dietary modifications.    Advised of ADA, low carb, low-fat, low-cholesterol diet, and importance of portion control.  Advised of increasing aerobic activity as tolerated and healthy weight maintenance.  Avoid soda, simple sweets, and limit rice/pasta/breads/starches (no more than 45-50 grams per meal).  Maintain healthy weight with goal BMI <30.  Exercise 5 times per week for 30 minutes  per day.  Discussed importance of lifestyle and dietary modifications to prevent diabetes.          Orders:  -     Hemoglobin A1C; Future; Expected date: 12/13/2024    6. Stage 2 chronic kidney disease  Assessment & Plan:  Stable, chronic.  Avoid NSAIDs, avoid nephrotoxic agents. Continue to hydrate well daily.  Reviewed trending labs.  Discussed renal diet advice.  Discussed prevention of worsening renal indices and how to maintain healthy kidney function. Maintain optimal blood pressure control.        7. Hypertension, unspecified type  Overview:  Benazepril 40 mg once daily   Isosorbide 30 mg once daily   Metoprolol XL 25 mg once daily    Assessment & Plan:  Goal BP < 140/90, best goal is BP <130/80 consistently   Reduce the amount of salt in your diet, follow a 2 gm sodium, DASH diet daily            Lose weight if you are overweight or have obesity  Avoid drinking too much alcohol  Stop smoking  Exercise at least 30 minutes per day most days of the week      Orders:  -     CBC Auto Differential; Future; Expected date: 12/13/2024  -     Comprehensive Metabolic Panel; Future; Expected date: 12/13/2024  -     TSH; Future; Expected date: 12/13/2024  -     Urinalysis; Future; Expected date: 12/13/2024  -     T4, Free; Future; Expected date: 12/13/2024  -     Microalbumin/Creatinine Ratio, Urine; Future; Expected date: 12/13/2024    Other orders  -     umeclidinium-vilanteroL (ANORO ELLIPTA) 62.5-25 mcg/actuation DsDv; Inhale 1 puff into the lungs once daily. Controller  Dispense: 60 each; Refill: 2         No follow-ups on file. In addition to their scheduled follow up, the patient has also been instructed to follow up on as needed basis.     Future Appointments   Date Time Provider Department Center   12/16/2024  2:20 PM Asher Chowdhury FNP [facility]        [facility]

## 2024-06-13 NOTE — LETTER
I certify that (Name) Len Goss meets the requirements as outlined in # 3, 4 (shown on reverse side) and qualifies for a mobility impaired license plate/hang-tag. I further understand that willful and false certification shall subject me to fines/imprisonment as outlined in R.S. 47:463.4 (G) (4). The applicant's information is as follows:    YOB: 1960            Race:White        Gender:Male    Address:  [address] [place] [address]    City:[place]                               State:[place]     Zip Code:[address]     [x]Permanently Impaired - Applicant has a total or lifelong condition of mobility impairment from which little or no improvement or recovery can reasonably be expected. A medical examiners certification is required on initial application only.      [] Temporarily Impaired - Applicant has a temporary condition of mobility impairment of which improvement or recovery can reasonably be expected. Applicant is entitled to a hangtag, which will be valid for one (1) year. A medical examiners certification is required for the renewal of the hangtag      [] Unable to appear in person at the Office of Motor Vehicles - Applicant must bring facial photo        Medical Examiner's Signature________________________________________ Date:6/13/24_________________________    Printed Name:______Asher Chowdhury, ANGEL_____________________________________________    State License #______[id number]_______________________    Address: [facility]___                                     Phone Number: [phone]                                                                                                                                                                                                                  City: [place]__________________________________ State: [place] __Zip Code: [address] _______________    TO BE COMPLETED BY MOTOR VEHICLE ANALYST ONLY    YULIANA   Lic.  Plate #      Hangtag Control #   Hangtag ID #      Date Issued:    #:   Office #:

## 2024-06-14 PROBLEM — Z12.5 SCREENING FOR MALIGNANT NEOPLASM OF PROSTATE: Status: ACTIVE | Noted: 2023-01-30

## 2024-06-14 NOTE — ASSESSMENT & PLAN NOTE
Stable, chronic.  Avoid NSAIDs, avoid nephrotoxic agents. Continue to hydrate well daily.  Reviewed trending labs.  Discussed renal diet advice.  Discussed prevention of worsening renal indices and how to maintain healthy kidney function. Maintain optimal blood pressure control.

## 2024-06-14 NOTE — ASSESSMENT & PLAN NOTE
Lab Results   Component Value Date    HGBA1C 5.1 01/25/2024    HGBA1C 5.1 12/29/2022    LDL 58.00 01/25/2024    CREATININE 1.02 01/25/2024       Discussed importance of diabetes prevention through lifestyle and dietary modifications.    Advised of ADA, low carb, low-fat, low-cholesterol diet, and importance of portion control.  Advised of increasing aerobic activity as tolerated and healthy weight maintenance.  Avoid soda, simple sweets, and limit rice/pasta/breads/starches (no more than 45-50 grams per meal).  Maintain healthy weight with goal BMI <30.  Exercise 5 times per week for 30 minutes per day.  Discussed importance of lifestyle and dietary modifications to prevent diabetes.

## 2024-08-05 DIAGNOSIS — J44.9 SEVERE CHRONIC OBSTRUCTIVE PULMONARY DISEASE: ICD-10-CM

## 2024-08-06 RX ORDER — TIOTROPIUM BROMIDE AND OLODATEROL 3.124; 2.736 UG/1; UG/1
2 SPRAY, METERED RESPIRATORY (INHALATION) DAILY
Qty: 4 G | Refills: 6 | Status: SHIPPED | OUTPATIENT
Start: 2024-08-06 | End: 2024-08-09 | Stop reason: SDUPTHER

## 2024-08-09 DIAGNOSIS — J44.9 SEVERE CHRONIC OBSTRUCTIVE PULMONARY DISEASE: ICD-10-CM

## 2024-08-09 RX ORDER — TIOTROPIUM BROMIDE AND OLODATEROL 3.124; 2.736 UG/1; UG/1
2 SPRAY, METERED RESPIRATORY (INHALATION) DAILY
Qty: 4 G | Refills: 6 | Status: SHIPPED | OUTPATIENT
Start: 2024-08-09

## 2024-08-19 DIAGNOSIS — Z86.79 HISTORY OF ARTERIOSCLEROTIC CARDIOVASCULAR DISEASE: ICD-10-CM

## 2024-08-19 DIAGNOSIS — E78.2 MIXED HYPERLIPIDEMIA: ICD-10-CM

## 2024-08-19 DIAGNOSIS — I10 HYPERTENSION, UNSPECIFIED TYPE: ICD-10-CM

## 2024-08-19 RX ORDER — ATORVASTATIN CALCIUM 10 MG/1
10 TABLET, FILM COATED ORAL NIGHTLY
Qty: 90 TABLET | Refills: 1 | Status: SHIPPED | OUTPATIENT
Start: 2024-08-19

## 2024-08-19 RX ORDER — BENAZEPRIL HYDROCHLORIDE 40 MG/1
40 TABLET ORAL DAILY
Qty: 90 TABLET | Refills: 1 | Status: SHIPPED | OUTPATIENT
Start: 2024-08-19

## 2024-08-29 ENCOUNTER — HOSPITAL ENCOUNTER (OUTPATIENT)
Dept: RADIOLOGY | Facility: HOSPITAL | Age: 64
Discharge: HOME | End: 2024-08-29
Attending: NURSE PRACTITIONER
Payer: MEDICARE

## 2024-08-29 DIAGNOSIS — Z87.891 HISTORY OF SMOKING: ICD-10-CM

## 2024-08-29 PROCEDURE — 71271 CT THORAX LUNG CANCER SCR C-: CPT | Mod: TC

## 2024-09-03 ENCOUNTER — TELEPHONE (OUTPATIENT)
Dept: INTERNAL MEDICINE | Facility: CLINIC | Age: 64
End: 2024-09-03
Payer: MEDICARE

## 2024-09-03 NOTE — TELEPHONE ENCOUNTER
Patient notified via phone that provider has not reviewed results as of this time, if results are abnormal provider will have me notify patient. Patient verbalized understanding.

## 2024-09-03 NOTE — TELEPHONE ENCOUNTER
----- Message from Cindy Sesay sent at 8/30/2024  8:39 AM CDT -----  Who Called: Len Goss    Caller is requesting information on test results.    Name of Test (Lab/Mammo/Etc): CT  Date of Test:   Where the test was performed:   Ordering Provider:     Patient's Preferred Phone Number on File: [phone]   Best Call Back Number, if different:  Additional Information: pt called to follow up on CT results completed on yesterday, please follow up

## 2024-11-05 DIAGNOSIS — I10 HYPERTENSION, UNSPECIFIED TYPE: ICD-10-CM

## 2024-11-07 RX ORDER — METOPROLOL SUCCINATE 25 MG/1
25 TABLET, EXTENDED RELEASE ORAL DAILY
Qty: 90 TABLET | Refills: 1 | Status: SHIPPED | OUTPATIENT
Start: 2024-11-07

## 2024-11-11 DIAGNOSIS — Z86.79 HISTORY OF ARTERIOSCLEROTIC CARDIOVASCULAR DISEASE: ICD-10-CM

## 2024-11-11 DIAGNOSIS — I10 HYPERTENSION, UNSPECIFIED TYPE: ICD-10-CM

## 2024-11-12 RX ORDER — AMLODIPINE BESYLATE 10 MG/1
10 TABLET ORAL DAILY
Qty: 90 TABLET | Refills: 1 | Status: SHIPPED | OUTPATIENT
Start: 2024-11-12

## 2024-11-12 RX ORDER — ISOSORBIDE MONONITRATE 30 MG/1
30 TABLET, EXTENDED RELEASE ORAL DAILY
Qty: 90 TABLET | Refills: 1 | Status: SHIPPED | OUTPATIENT
Start: 2024-11-12 | End: 2025-05-11

## 2025-02-04 ENCOUNTER — TELEPHONE (OUTPATIENT)
Dept: FAMILY MEDICINE | Facility: CLINIC | Age: 65
End: 2025-02-04
Payer: MEDICARE

## 2025-02-04 DIAGNOSIS — D52.0 DIETARY FOLATE DEFICIENCY ANEMIA: ICD-10-CM

## 2025-02-04 NOTE — TELEPHONE ENCOUNTER
Called and spoke with patient, returning phone call. Verified . Patient made aware LOV 24 patient given a script for #30 with 11 refills. Patient encouraged to contact his pharmacy where he brought written Rx. Verbalized understanding. Patient to call with any questions or concerns.

## 2025-02-04 NOTE — TELEPHONE ENCOUNTER
Request paper script.   Has appointment scheduled  04/24/2025 Please advise      Nevin Hanson Staff  Caller: Unspecified (Today, 10:09 AM)    Preferred Method of Contact: Phone Call  Patient's Preferred Phone Number on File: [phone]  Best Call Back Number, if different:  Additional Information: medical advice, pt called to request a written Rx for medication: folic acid (FOLVITE) 1 MG tablet      30 tablet, to be picked up to bring to his pharmacy,pt also stated to please call when ready,  please advise, thanks  Pt is out of meds requesting refill request call back

## 2025-02-04 NOTE — TELEPHONE ENCOUNTER
----- Message from Radha sent at 2/4/2025  7:28 AM CST -----    ----- Message -----  From: Radha Nj  Sent: 2/3/2025  11:17 AM CST  To: [facility] Internal Medicine Clerical Pool    Who Called: Len ELEAZAR Goss    Caller is requesting assistance/information from provider's office.    Symptoms (please be specific): n/a   How long has patient had these symptoms:  n/a  List of preferred pharmacies on file (remove unneeded): [unfilled]  If different, enter pharmacy into here including location and phone number: n/a      Preferred Method of Contact: Phone Call  Patient's Preferred Phone Number on File: [phone]   Best Call Back Number, if different:  Additional Information: medical advice, pt called to request a written Rx for medication: folic acid (FOLVITE) 1 MG tablet 30 tablet, to be picked up to bring to his pharmacy,pt also stated to please call when ready,  please advise, thanks

## 2025-02-05 RX ORDER — FOLIC ACID 1 MG/1
1 TABLET ORAL DAILY
Qty: 30 TABLET | Refills: 11 | Status: SHIPPED | OUTPATIENT
Start: 2025-02-05 | End: 2026-01-31

## 2025-02-17 DIAGNOSIS — I10 HYPERTENSION, UNSPECIFIED TYPE: ICD-10-CM

## 2025-02-17 DIAGNOSIS — E78.2 MIXED HYPERLIPIDEMIA: ICD-10-CM

## 2025-02-17 DIAGNOSIS — Z86.79 HISTORY OF ARTERIOSCLEROTIC CARDIOVASCULAR DISEASE: ICD-10-CM

## 2025-02-17 RX ORDER — BENAZEPRIL HYDROCHLORIDE 40 MG/1
40 TABLET ORAL DAILY
Qty: 90 TABLET | Refills: 1 | Status: SHIPPED | OUTPATIENT
Start: 2025-02-17

## 2025-02-17 RX ORDER — ATORVASTATIN CALCIUM 10 MG/1
10 TABLET, FILM COATED ORAL NIGHTLY
Qty: 90 TABLET | Refills: 1 | Status: SHIPPED | OUTPATIENT
Start: 2025-02-17

## 2025-04-17 ENCOUNTER — LAB VISIT (OUTPATIENT)
Dept: LAB | Facility: HOSPITAL | Age: 65
End: 2025-04-17
Attending: NURSE PRACTITIONER
Payer: MEDICARE

## 2025-04-17 DIAGNOSIS — R73.03 PREDIABETES: ICD-10-CM

## 2025-04-17 DIAGNOSIS — E78.2 MIXED HYPERLIPIDEMIA: ICD-10-CM

## 2025-04-17 DIAGNOSIS — I10 HYPERTENSION, UNSPECIFIED TYPE: ICD-10-CM

## 2025-04-17 LAB
ALBUMIN SERPL-MCNC: 4 G/DL (ref 3.4–4.8)
ALBUMIN/GLOB SERPL: 1.2 RATIO (ref 1.1–2)
ALP SERPL-CCNC: 81 UNIT/L (ref 40–150)
ALT SERPL-CCNC: 16 UNIT/L (ref 0–55)
ANION GAP SERPL CALC-SCNC: 8 MEQ/L
AST SERPL-CCNC: 20 UNIT/L (ref 11–45)
BASOPHILS # BLD AUTO: 0.06 X10(3)/MCL
BASOPHILS NFR BLD AUTO: 1.2 %
BILIRUB SERPL-MCNC: 0.5 MG/DL
BUN SERPL-MCNC: 15 MG/DL (ref 8.4–25.7)
CALCIUM SERPL-MCNC: 9.6 MG/DL (ref 8.8–10)
CHLORIDE SERPL-SCNC: 106 MMOL/L (ref 98–107)
CHOLEST SERPL-MCNC: 144 MG/DL
CHOLEST/HDLC SERPL: 2 {RATIO} (ref 0–5)
CO2 SERPL-SCNC: 26 MMOL/L (ref 23–31)
CREAT SERPL-MCNC: 1.19 MG/DL (ref 0.72–1.25)
CREAT UR-MCNC: 99 MG/DL (ref 63–166)
CREAT/UREA NIT SERPL: 13
EOSINOPHIL # BLD AUTO: 0.1 X10(3)/MCL (ref 0–0.9)
EOSINOPHIL NFR BLD AUTO: 2.1 %
ERYTHROCYTE [DISTWIDTH] IN BLOOD BY AUTOMATED COUNT: 12.4 % (ref 11.5–17)
EST. AVERAGE GLUCOSE BLD GHB EST-MCNC: 105.4 MG/DL
GFR SERPLBLD CREATININE-BSD FMLA CKD-EPI: >60 ML/MIN/1.73/M2
GLOBULIN SER-MCNC: 3.3 GM/DL (ref 2.4–3.5)
GLUCOSE SERPL-MCNC: 89 MG/DL (ref 82–115)
HBA1C MFR BLD: 5.3 %
HCT VFR BLD AUTO: 39.5 % (ref 42–52)
HDLC SERPL-MCNC: 96 MG/DL (ref 35–60)
HGB BLD-MCNC: 13 G/DL (ref 14–18)
IMM GRANULOCYTES # BLD AUTO: 0 X10(3)/MCL (ref 0–0.04)
IMM GRANULOCYTES NFR BLD AUTO: 0 %
LDLC SERPL CALC-MCNC: 36 MG/DL (ref 50–140)
LYMPHOCYTES # BLD AUTO: 1.2 X10(3)/MCL (ref 0.6–4.6)
LYMPHOCYTES NFR BLD AUTO: 24.9 %
MCH RBC QN AUTO: 31.4 PG (ref 27–31)
MCHC RBC AUTO-ENTMCNC: 32.9 G/DL (ref 33–36)
MCV RBC AUTO: 95.4 FL (ref 80–94)
MICROALBUMIN UR-MCNC: <5 UG/ML
MICROALBUMIN/CREAT RATIO PNL UR: NORMAL
MONOCYTES # BLD AUTO: 0.56 X10(3)/MCL (ref 0.1–1.3)
MONOCYTES NFR BLD AUTO: 11.6 %
NEUTROPHILS # BLD AUTO: 2.89 X10(3)/MCL (ref 2.1–9.2)
NEUTROPHILS NFR BLD AUTO: 60.2 %
NRBC BLD AUTO-RTO: 0 %
PLATELET # BLD AUTO: 187 X10(3)/MCL (ref 130–400)
PMV BLD AUTO: 12.1 FL (ref 7.4–10.4)
POTASSIUM SERPL-SCNC: 4.2 MMOL/L (ref 3.5–5.1)
PROT SERPL-MCNC: 7.3 GM/DL (ref 5.8–7.6)
RBC # BLD AUTO: 4.14 X10(6)/MCL (ref 4.7–6.1)
SODIUM SERPL-SCNC: 140 MMOL/L (ref 136–145)
T4 FREE SERPL-MCNC: 0.98 NG/DL (ref 0.7–1.48)
TRIGL SERPL-MCNC: 60 MG/DL (ref 34–140)
TSH SERPL-ACNC: 2.08 UIU/ML (ref 0.35–4.94)
VLDLC SERPL CALC-MCNC: 12 MG/DL
WBC # BLD AUTO: 4.81 X10(3)/MCL (ref 4.5–11.5)

## 2025-04-17 PROCEDURE — 84443 ASSAY THYROID STIM HORMONE: CPT

## 2025-04-17 PROCEDURE — 36415 COLL VENOUS BLD VENIPUNCTURE: CPT

## 2025-04-17 PROCEDURE — 80061 LIPID PANEL: CPT

## 2025-04-17 PROCEDURE — 85025 COMPLETE CBC W/AUTO DIFF WBC: CPT

## 2025-04-17 PROCEDURE — 83036 HEMOGLOBIN GLYCOSYLATED A1C: CPT

## 2025-04-17 PROCEDURE — 84439 ASSAY OF FREE THYROXINE: CPT

## 2025-04-17 PROCEDURE — 80053 COMPREHEN METABOLIC PANEL: CPT

## 2025-04-17 PROCEDURE — 82043 UR ALBUMIN QUANTITATIVE: CPT

## 2025-04-24 ENCOUNTER — OFFICE VISIT (OUTPATIENT)
Dept: INTERNAL MEDICINE | Facility: CLINIC | Age: 65
End: 2025-04-24
Payer: MEDICARE

## 2025-04-24 VITALS
SYSTOLIC BLOOD PRESSURE: 135 MMHG | OXYGEN SATURATION: 97 % | DIASTOLIC BLOOD PRESSURE: 68 MMHG | TEMPERATURE: 98 F | BODY MASS INDEX: 21.13 KG/M2 | WEIGHT: 156 LBS | RESPIRATION RATE: 20 BRPM | HEIGHT: 72 IN | HEART RATE: 60 BPM

## 2025-04-24 DIAGNOSIS — Z99.81 CHRONIC RESPIRATORY FAILURE WITH HYPOXIA, ON HOME O2 THERAPY: ICD-10-CM

## 2025-04-24 DIAGNOSIS — D52.0 DIETARY FOLATE DEFICIENCY ANEMIA: ICD-10-CM

## 2025-04-24 DIAGNOSIS — J44.9 SEVERE CHRONIC OBSTRUCTIVE PULMONARY DISEASE: ICD-10-CM

## 2025-04-24 DIAGNOSIS — D64.9 ANEMIA, UNSPECIFIED TYPE: Primary | ICD-10-CM

## 2025-04-24 DIAGNOSIS — Z87.891 HISTORY OF TOBACCO ABUSE: ICD-10-CM

## 2025-04-24 DIAGNOSIS — Z12.5 SCREENING FOR PROSTATE CANCER: ICD-10-CM

## 2025-04-24 DIAGNOSIS — E78.2 MIXED HYPERLIPIDEMIA: ICD-10-CM

## 2025-04-24 DIAGNOSIS — Z99.81 OXYGEN DEPENDENT: ICD-10-CM

## 2025-04-24 DIAGNOSIS — I10 HYPERTENSION, UNSPECIFIED TYPE: ICD-10-CM

## 2025-04-24 DIAGNOSIS — Z86.79 HISTORY OF ARTERIOSCLEROTIC CARDIOVASCULAR DISEASE: ICD-10-CM

## 2025-04-24 DIAGNOSIS — J96.11 CHRONIC RESPIRATORY FAILURE WITH HYPOXIA, ON HOME O2 THERAPY: ICD-10-CM

## 2025-04-24 DIAGNOSIS — I10 PRIMARY HYPERTENSION: ICD-10-CM

## 2025-04-24 PROCEDURE — 1101F PT FALLS ASSESS-DOCD LE1/YR: CPT | Mod: CPTII,,, | Performed by: NURSE PRACTITIONER

## 2025-04-24 PROCEDURE — 3078F DIAST BP <80 MM HG: CPT | Mod: CPTII,,, | Performed by: NURSE PRACTITIONER

## 2025-04-24 PROCEDURE — 3044F HG A1C LEVEL LT 7.0%: CPT | Mod: CPTII,,, | Performed by: NURSE PRACTITIONER

## 2025-04-24 PROCEDURE — 3075F SYST BP GE 130 - 139MM HG: CPT | Mod: CPTII,,, | Performed by: NURSE PRACTITIONER

## 2025-04-24 PROCEDURE — 3288F FALL RISK ASSESSMENT DOCD: CPT | Mod: CPTII,,, | Performed by: NURSE PRACTITIONER

## 2025-04-24 PROCEDURE — 1159F MED LIST DOCD IN RCRD: CPT | Mod: CPTII,,, | Performed by: NURSE PRACTITIONER

## 2025-04-24 PROCEDURE — 1160F RVW MEDS BY RX/DR IN RCRD: CPT | Mod: CPTII,,, | Performed by: NURSE PRACTITIONER

## 2025-04-24 PROCEDURE — 99214 OFFICE O/P EST MOD 30 MIN: CPT | Mod: S$PBB,,, | Performed by: NURSE PRACTITIONER

## 2025-04-24 PROCEDURE — 4010F ACE/ARB THERAPY RXD/TAKEN: CPT | Mod: CPTII,,, | Performed by: NURSE PRACTITIONER

## 2025-04-24 PROCEDURE — 3061F NEG MICROALBUMINURIA REV: CPT | Mod: CPTII,,, | Performed by: NURSE PRACTITIONER

## 2025-04-24 PROCEDURE — 3008F BODY MASS INDEX DOCD: CPT | Mod: CPTII,,, | Performed by: NURSE PRACTITIONER

## 2025-04-24 PROCEDURE — 99214 OFFICE O/P EST MOD 30 MIN: CPT | Mod: PBBFAC | Performed by: NURSE PRACTITIONER

## 2025-04-24 PROCEDURE — 3066F NEPHROPATHY DOC TX: CPT | Mod: CPTII,,, | Performed by: NURSE PRACTITIONER

## 2025-04-24 RX ORDER — ALBUTEROL SULFATE 90 UG/1
2 INHALANT RESPIRATORY (INHALATION) EVERY 6 HOURS PRN
Qty: 18 G | Refills: 3 | Status: SHIPPED | OUTPATIENT
Start: 2025-04-24 | End: 2025-04-25 | Stop reason: SDUPTHER

## 2025-04-24 RX ORDER — BENAZEPRIL HYDROCHLORIDE 40 MG/1
40 TABLET ORAL DAILY
Qty: 90 TABLET | Refills: 1 | Status: SHIPPED | OUTPATIENT
Start: 2025-04-24

## 2025-04-24 RX ORDER — AMLODIPINE BESYLATE 10 MG/1
10 TABLET ORAL DAILY
Qty: 90 TABLET | Refills: 1 | Status: SHIPPED | OUTPATIENT
Start: 2025-04-24

## 2025-04-24 RX ORDER — ATORVASTATIN CALCIUM 10 MG/1
10 TABLET, FILM COATED ORAL NIGHTLY
Qty: 90 TABLET | Refills: 1 | Status: SHIPPED | OUTPATIENT
Start: 2025-04-24

## 2025-04-24 RX ORDER — IPRATROPIUM BROMIDE AND ALBUTEROL SULFATE 2.5; .5 MG/3ML; MG/3ML
3 SOLUTION RESPIRATORY (INHALATION)
Qty: 75 ML | Refills: 6 | Status: SHIPPED | OUTPATIENT
Start: 2025-04-24 | End: 2025-04-25 | Stop reason: SDUPTHER

## 2025-04-24 RX ORDER — FOLIC ACID 1 MG/1
1 TABLET ORAL DAILY
Qty: 30 TABLET | Refills: 11 | Status: SHIPPED | OUTPATIENT
Start: 2025-04-24 | End: 2026-04-19

## 2025-04-24 RX ORDER — TIOTROPIUM BROMIDE AND OLODATEROL 3.124; 2.736 UG/1; UG/1
2 SPRAY, METERED RESPIRATORY (INHALATION) DAILY
Qty: 4 G | Refills: 6 | Status: SHIPPED | OUTPATIENT
Start: 2025-04-24

## 2025-04-24 NOTE — PROGRESS NOTES
Patient ID: [id number]     Chief Complaint: Establish Care        HPI:     HPI      Len Goss is a 64 y.o. male here today to establish care. Pt has hx HTN, HLD, CKD, Carotid stenosis, History abnormal stress test, Severe stage 3 COPD, + Asbestos exposure, O2 dependency 2-3 L per NC.         Immunizations:   Immunization History   Administered Date(s) Administered    Tdap 04/15/2019        -------------------------------------    Anemia, unspecified    Cardiovascular disease    COPD (chronic obstructive pulmonary disease)    Hypertension    Mixed hyperlipidemia    Prediabetes    Stage 2 chronic kidney disease        Past Surgical History:   Procedure Laterality Date    COLONOSCOPY N/A 5/12/2022    Procedure: COLONOSCOPY;  Surgeon: Tai Barrios MD;  Location: [facility] ENDOSCOPY;  Service: Endoscopy;  Laterality: N/A;       Review of patient's allergies indicates:   Allergen Reactions    Penicillins Swelling    Penicillin      Other reaction(s): Swelling       Current Outpatient Medications   Medication Instructions    albuterol (PROVENTIL/VENTOLIN HFA) 90 mcg/actuation inhaler 2 puffs, Inhalation, Every 6 hours PRN, Rescue    albuterol-ipratropium (DUO-NEB) 2.5 mg-0.5 mg/3 mL nebulizer solution 3 mLs, Nebulization, Every 4-6 hours PRN    amLODIPine (NORVASC) 10 mg, Oral, Daily    aspirin (ECOTRIN) 81 mg, Oral, Daily    atorvastatin (LIPITOR) 10 mg, Oral, Nightly    benazepriL (LOTENSIN) 40 mg, Oral, Daily    folic acid (FOLVITE) 1 mg, Oral, Daily    isosorbide mononitrate (IMDUR) 30 mg, Oral, Daily    metoprolol succinate (TOPROL-XL) 25 mg, Oral, Daily    nitroGLYCERIN (NITROSTAT) 0.4 mg, Every 5 min PRN    tiotropium-olodateroL (STIOLTO RESPIMAT) 2.5-2.5 mcg/actuation Mist 2 puffs, Inhalation, Daily       Social History[1]     Family History   Problem Relation Name Age of Onset    Diabetes type II Mother      Lung cancer Mother      Hypertension Father          Patient Care Team:  Asher Chowdhury, ZACARIAS-C  as PCP - General (Family Medicine)     Subjective:     Review of Systems     See HPI for details    Constitutional: Denies Change in appetite. Denies Chills. Denies Fever. Denies Night sweats.  Eye: Denies Blurred vision. Denies Discharge. Denies Eye pain.  ENT: Denies Decreased hearing. Denies Sore throat. Denies Swollen glands.  Respiratory: Denies Cough. Denies Shortness of breath. Denies Shortness of breath with exertion. Denies Wheezing.  Cardiovascular: DeniesChest pain at rest. Denies Chest pain with exertion. Denies Irregular heartbeat. Denies Palpitations. Denies Edema.  Gastrointestinal: Denies Abdominal pain. DeniesDiarrhea. Denies Nausea. Denies Vomiting. Denies Hematemesis or Hematochezia.  Genitourinary: Denies Dysuria. Denies Urinary frequency. Denies Urinary urgency. Denies Blood in urine.  Endocrine: Denies Cold intolerance. Denies Excessive thirst. Denies Heat intolerance. Denies Weight loss. Denies Weight gain.  Musculoskeletal: Denies Painful joints. Denies Weakness.  Integumentary: Denies Rash. Denies Itching. Denies Dry skin.  Neurologic: Denies Dizziness. Denies Fainting. Denies Headache.  Psychiatric: Denies Depression. Denies Anxiety. Denies Suicidal/Homicidal ideations.    All Other ROS: Negative except as stated in HPI.       Objective:     Visit Vitals  /68 (BP Location: Right arm, Patient Position: Sitting)   Pulse 60   Temp 97.8 °F (36.6 °C) (Oral)   Resp 20   Ht 6' (1.829 m)   Wt 70.8 kg (156 lb)   SpO2 97%   BMI 21.16 kg/m²       Physical Exam    General: Alert and oriented, No acute distress.  Head: Normocephalic, Atraumatic.  Eye: Pupils are equal, round and reactive to light, Extraocular movements are intact, Sclera non-icteric.  Ears/Nose/Throat: Normal, Mucosa moist,Clear.  Neck/Thyroid: Supple, Non-tender, No carotid bruit, No lymphadenopathy, No JVD, Full range of motion.  Respiratory: Clear to auscultation bilaterally; No wheezes, rales or rhonchi,Non-labored  respirations, Symmetrical chest wall expansion.  Cardiovascular: Regular rate and rhythm, S1/S2 normal, No murmurs, rubs or gallops.  Gastrointestinal: Soft, Non-tender, Non-distended, Normal bowel sounds, No palpable organomegaly.  Musculoskeletal: Normal range of motion.  Integumentary: Warm, Dry, Intact, No suspicious lesions or rashes.  Extremities: No clubbing, cyanosis or edema  Neurologic: No focal deficits, Cranial Nerves II-XII are grossly intact, Motor strength normal upper and lower extremities, Sensory exam intact.  Psychiatric: Normal interaction, Coherent speech, Euthymic mood, Appropriate affect       Labs Reviewed:     Chemistry:  Lab Results   Component Value Date     04/17/2025    K 4.2 04/17/2025    BUN 15.0 04/17/2025    CREATININE 1.19 04/17/2025    EGFRNORACEVR >60 04/17/2025    GLUCOSE 89 04/17/2025    CALCIUM 9.6 04/17/2025    ALKPHOS 81 04/17/2025    LABPROT 7.3 04/17/2025    ALBUMIN 4.0 04/17/2025    BILIDIR 0.2 01/19/2022    IBILI 0.10 01/19/2022    AST 20 04/17/2025    ALT 16 04/17/2025        Lab Results   Component Value Date    HGBA1C 5.3 04/17/2025        Hematology:  Lab Results   Component Value Date    WBC 4.81 04/17/2025    HGB 13.0 (L) 04/17/2025    HCT 39.5 (L) 04/17/2025     04/17/2025       Lipid Panel:  Lab Results   Component Value Date    CHOL 144 04/17/2025    HDL 96 (H) 04/17/2025    LDL 36.00 (L) 04/17/2025    TRIG 60 04/17/2025    TOTALCHOLEST 2 04/17/2025        Urine:  Lab Results   Component Value Date    APPEARANCEUA Clear 01/25/2024    SGUA 1.022 01/25/2024    PROTEINUA Negative 01/25/2024    KETONESUA Negative 01/25/2024    LEUKOCYTESUR Negative 01/25/2024    RBCUA None Seen 01/25/2024    WBCUA 0-5 01/25/2024    BACTERIA None Seen 01/25/2024    SQEPUA None Seen 01/25/2024    HYALINECASTS None Seen 01/25/2024    CREATRANDUR 99.0 04/17/2025        Assessment:       ICD-10-CM ICD-9-CM   1. Anemia, unspecified type  D64.9 285.9   2. Primary hypertension   I10 401.9   3. Screening for prostate cancer  Z12.5 V76.44   4. Severe chronic obstructive pulmonary disease  J44.9 496   5. Oxygen dependent  Z99.81 V46.2   6. Chronic respiratory failure with hypoxia, on home O2 therapy  J96.11 518.83    Z99.81 799.02     V46.2        Plan:     1. Anemia, unspecified type (Primary)  CBC stable. CBC and iron profile in 4 months.     2. Primary hypertension  Low fat low salt diet and exercise. Cont Benazepril. Amolodipine, Isosorbide as prescribed. US AAA in 4 months.     3. Screening for prostate cancer  PSA in 4 months.      4. Severe chronic COPD  Avoid triggers. Cont Stiolto respimat inhaler, Ventolin HFA, Duonebs as prescribed.     5. O2 Dependent  Cont O2 2-3 L per NC as prescribed.       Follow up in about 4 months (around 2025) for with labs 1 week prior to appt. . In addition to their scheduled follow up, the patient has also been instructed to follow up on as needed basis.     No future appointments.     Racquel Bhagat, ZACARIAS             [1]   Social History  Socioeconomic History    Marital status:      Spouse name: Kennedi    Number of children: 2   Tobacco Use    Smoking status: Former     Current packs/day: 0.00     Types: Cigarettes     Start date:      Quit date: 10/1/2016     Years since quittin.5    Smokeless tobacco: Never   Substance and Sexual Activity    Alcohol use: Yes     Alcohol/week: 2.0 - 4.0 standard drinks of alcohol     Types: 2 - 4 Cans of beer per week     Comment: daily    Drug use: Never    Sexual activity: Yes     Partners: Female

## 2025-04-25 RX ORDER — ALBUTEROL SULFATE 90 UG/1
2 INHALANT RESPIRATORY (INHALATION) EVERY 6 HOURS PRN
Qty: 8.5 G | Refills: 3 | Status: SHIPPED | OUTPATIENT
Start: 2025-04-25

## 2025-04-25 RX ORDER — IPRATROPIUM BROMIDE AND ALBUTEROL SULFATE 2.5; .5 MG/3ML; MG/3ML
3 SOLUTION RESPIRATORY (INHALATION)
Qty: 75 ML | Refills: 6 | Status: SHIPPED | OUTPATIENT
Start: 2025-04-25

## 2025-05-05 DIAGNOSIS — I10 HYPERTENSION, UNSPECIFIED TYPE: ICD-10-CM

## 2025-05-05 RX ORDER — METOPROLOL SUCCINATE 25 MG/1
25 TABLET, EXTENDED RELEASE ORAL DAILY
Qty: 90 TABLET | Refills: 1 | Status: SHIPPED | OUTPATIENT
Start: 2025-05-05

## 2025-05-05 NOTE — TELEPHONE ENCOUNTER
Refill request for metoprolol succinate (TOPROL-XL) 25 MG 24 hr tablet in chart. Next appt 08/26/2025.

## 2025-05-12 DIAGNOSIS — Z86.79 HISTORY OF ARTERIOSCLEROTIC CARDIOVASCULAR DISEASE: ICD-10-CM

## 2025-05-13 RX ORDER — ISOSORBIDE MONONITRATE 30 MG/1
30 TABLET, EXTENDED RELEASE ORAL DAILY
Qty: 90 TABLET | Refills: 1 | Status: SHIPPED | OUTPATIENT
Start: 2025-05-13 | End: 2025-11-09

## 2025-07-28 ENCOUNTER — HOSPITAL ENCOUNTER (OUTPATIENT)
Dept: RADIOLOGY | Facility: HOSPITAL | Age: 65
Discharge: HOME | End: 2025-07-28
Attending: NURSE PRACTITIONER
Payer: MEDICARE

## 2025-07-28 ENCOUNTER — TELEPHONE (OUTPATIENT)
Dept: INTERNAL MEDICINE | Facility: CLINIC | Age: 65
End: 2025-07-28
Payer: MEDICARE

## 2025-07-28 DIAGNOSIS — I10 PRIMARY HYPERTENSION: ICD-10-CM

## 2025-07-28 PROCEDURE — 76706 US ABDL AORTA SCREEN AAA: CPT | Mod: TC

## 2025-07-30 NOTE — TELEPHONE ENCOUNTER
Call pt and inform US AAA screening was normal. Keep f/u appts.        Patient given above results verbalized understanding.

## 2025-08-19 ENCOUNTER — LAB VISIT (OUTPATIENT)
Dept: LAB | Facility: HOSPITAL | Age: 65
End: 2025-08-19
Attending: NURSE PRACTITIONER
Payer: MEDICARE

## 2025-08-19 DIAGNOSIS — D64.9 ANEMIA, UNSPECIFIED TYPE: ICD-10-CM

## 2025-08-19 DIAGNOSIS — Z12.5 SCREENING FOR PROSTATE CANCER: ICD-10-CM

## 2025-08-19 DIAGNOSIS — I10 PRIMARY HYPERTENSION: ICD-10-CM

## 2025-08-19 LAB
ANION GAP SERPL CALC-SCNC: 8 MEQ/L
BASOPHILS # BLD AUTO: 0.06 X10(3)/MCL
BASOPHILS NFR BLD AUTO: 1.3 %
BUN SERPL-MCNC: 21 MG/DL (ref 8.4–25.7)
CALCIUM SERPL-MCNC: 9.6 MG/DL (ref 8.8–10)
CHLORIDE SERPL-SCNC: 109 MMOL/L (ref 98–107)
CO2 SERPL-SCNC: 24 MMOL/L (ref 23–31)
CREAT SERPL-MCNC: 0.88 MG/DL (ref 0.72–1.25)
CREAT/UREA NIT SERPL: 24
EOSINOPHIL # BLD AUTO: 0.1 X10(3)/MCL (ref 0–0.9)
EOSINOPHIL NFR BLD AUTO: 2.2 %
ERYTHROCYTE [DISTWIDTH] IN BLOOD BY AUTOMATED COUNT: 12.6 % (ref 11.5–17)
GFR SERPLBLD CREATININE-BSD FMLA CKD-EPI: >60 ML/MIN/1.73/M2
GLUCOSE SERPL-MCNC: 113 MG/DL (ref 82–115)
HCT VFR BLD AUTO: 38.5 % (ref 42–52)
HGB BLD-MCNC: 13 G/DL (ref 14–18)
IMM GRANULOCYTES # BLD AUTO: 0.01 X10(3)/MCL (ref 0–0.04)
IMM GRANULOCYTES NFR BLD AUTO: 0.2 %
LYMPHOCYTES # BLD AUTO: 1.13 X10(3)/MCL (ref 0.6–4.6)
LYMPHOCYTES NFR BLD AUTO: 24.6 %
MCH RBC QN AUTO: 32.7 PG (ref 27–31)
MCHC RBC AUTO-ENTMCNC: 33.8 G/DL (ref 33–36)
MCV RBC AUTO: 96.7 FL (ref 80–94)
MONOCYTES # BLD AUTO: 0.56 X10(3)/MCL (ref 0.1–1.3)
MONOCYTES NFR BLD AUTO: 12.2 %
NEUTROPHILS # BLD AUTO: 2.74 X10(3)/MCL (ref 2.1–9.2)
NEUTROPHILS NFR BLD AUTO: 59.5 %
NRBC BLD AUTO-RTO: 0 %
PLATELET # BLD AUTO: 157 X10(3)/MCL (ref 130–400)
PMV BLD AUTO: 11.8 FL (ref 7.4–10.4)
POTASSIUM SERPL-SCNC: 4 MMOL/L (ref 3.5–5.1)
PSA SERPL-MCNC: 2.09 NG/ML
RBC # BLD AUTO: 3.98 X10(6)/MCL (ref 4.7–6.1)
SODIUM SERPL-SCNC: 141 MMOL/L (ref 136–145)
WBC # BLD AUTO: 4.6 X10(3)/MCL (ref 4.5–11.5)

## 2025-08-19 PROCEDURE — 36415 COLL VENOUS BLD VENIPUNCTURE: CPT

## 2025-08-19 PROCEDURE — 80048 BASIC METABOLIC PNL TOTAL CA: CPT

## 2025-08-19 PROCEDURE — 85025 COMPLETE CBC W/AUTO DIFF WBC: CPT

## 2025-08-19 PROCEDURE — 84153 ASSAY OF PSA TOTAL: CPT

## 2025-08-26 ENCOUNTER — OFFICE VISIT (OUTPATIENT)
Dept: INTERNAL MEDICINE | Facility: CLINIC | Age: 65
End: 2025-08-26
Payer: MEDICARE

## 2025-08-26 VITALS
TEMPERATURE: 98 F | WEIGHT: 160.5 LBS | BODY MASS INDEX: 21.74 KG/M2 | DIASTOLIC BLOOD PRESSURE: 71 MMHG | SYSTOLIC BLOOD PRESSURE: 135 MMHG | RESPIRATION RATE: 20 BRPM | HEART RATE: 61 BPM | OXYGEN SATURATION: 96 % | HEIGHT: 72 IN

## 2025-08-26 DIAGNOSIS — E78.2 MIXED HYPERLIPIDEMIA: ICD-10-CM

## 2025-08-26 DIAGNOSIS — Z86.79 HISTORY OF ARTERIOSCLEROTIC CARDIOVASCULAR DISEASE: ICD-10-CM

## 2025-08-26 DIAGNOSIS — D64.9 ANEMIA, UNSPECIFIED TYPE: Primary | ICD-10-CM

## 2025-08-26 DIAGNOSIS — I10 HYPERTENSION, UNSPECIFIED TYPE: ICD-10-CM

## 2025-08-26 DIAGNOSIS — I10 PRIMARY HYPERTENSION: ICD-10-CM

## 2025-08-26 DIAGNOSIS — Z12.5 SCREENING FOR MALIGNANT NEOPLASM OF PROSTATE: ICD-10-CM

## 2025-08-26 DIAGNOSIS — J44.9 SEVERE CHRONIC OBSTRUCTIVE PULMONARY DISEASE: ICD-10-CM

## 2025-08-26 DIAGNOSIS — D52.0 DIETARY FOLATE DEFICIENCY ANEMIA: ICD-10-CM

## 2025-08-26 DIAGNOSIS — Z99.81 OXYGEN DEPENDENT: ICD-10-CM

## 2025-08-26 PROCEDURE — 99213 OFFICE O/P EST LOW 20 MIN: CPT | Mod: PBBFAC | Performed by: NURSE PRACTITIONER

## 2025-08-26 RX ORDER — FOLIC ACID 1 MG/1
1 TABLET ORAL DAILY
Qty: 30 TABLET | Refills: 6 | Status: SHIPPED | OUTPATIENT
Start: 2025-08-26 | End: 2026-08-21

## 2025-08-26 RX ORDER — TIOTROPIUM BROMIDE AND OLODATEROL 3.124; 2.736 UG/1; UG/1
2 SPRAY, METERED RESPIRATORY (INHALATION) DAILY
Qty: 4 G | Refills: 6 | Status: SHIPPED | OUTPATIENT
Start: 2025-08-26

## 2025-09-04 ENCOUNTER — HOSPITAL ENCOUNTER (OUTPATIENT)
Dept: RADIOLOGY | Facility: HOSPITAL | Age: 65
Discharge: HOME | End: 2025-09-04
Attending: NURSE PRACTITIONER
Payer: MEDICARE

## 2025-09-04 DIAGNOSIS — Z87.891 HISTORY OF TOBACCO ABUSE: ICD-10-CM

## 2025-09-04 PROCEDURE — 71271 CT THORAX LUNG CANCER SCR C-: CPT | Mod: TC

## (undated) DEVICE — SOL IRRI STRL WATER 1000ML

## (undated) DEVICE — KIT SURGICAL COLON .25 1.1OZ

## (undated) DEVICE — MANIFOLD 4 PORT

## (undated) DEVICE — TRAP ETRAP POLYP 50 TRAY